# Patient Record
Sex: MALE | Race: WHITE | Employment: OTHER | URBAN - METROPOLITAN AREA
[De-identification: names, ages, dates, MRNs, and addresses within clinical notes are randomized per-mention and may not be internally consistent; named-entity substitution may affect disease eponyms.]

---

## 2019-03-24 ENCOUNTER — APPOINTMENT (OUTPATIENT)
Dept: CT IMAGING | Age: 69
End: 2019-03-24
Payer: MEDICARE

## 2019-03-24 ENCOUNTER — HOSPITAL ENCOUNTER (EMERGENCY)
Age: 69
Discharge: ANOTHER HOSPITAL | End: 2019-03-24
Attending: EMERGENCY MEDICINE
Payer: MEDICARE

## 2019-03-24 ENCOUNTER — APPOINTMENT (OUTPATIENT)
Dept: GENERAL RADIOLOGY | Age: 69
End: 2019-03-24
Payer: MEDICARE

## 2019-03-24 VITALS
OXYGEN SATURATION: 97 % | HEART RATE: 140 BPM | RESPIRATION RATE: 25 BRPM | TEMPERATURE: 98.2 F | DIASTOLIC BLOOD PRESSURE: 59 MMHG | WEIGHT: 240 LBS | SYSTOLIC BLOOD PRESSURE: 95 MMHG

## 2019-03-24 DIAGNOSIS — J98.59 MEDIASTINAL MASS: ICD-10-CM

## 2019-03-24 DIAGNOSIS — R06.03 ACUTE RESPIRATORY DISTRESS: Primary | ICD-10-CM

## 2019-03-24 DIAGNOSIS — I48.91 ATRIAL FIBRILLATION WITH RVR (HCC): ICD-10-CM

## 2019-03-24 LAB
ABSOLUTE EOS #: 0 K/UL (ref 0–0.4)
ABSOLUTE IMMATURE GRANULOCYTE: ABNORMAL K/UL (ref 0–0.3)
ABSOLUTE LYMPH #: 0.43 K/UL (ref 1–4.8)
ABSOLUTE MONO #: 1.14 K/UL (ref 0.1–0.8)
ANION GAP SERPL CALCULATED.3IONS-SCNC: 15 MMOL/L (ref 9–17)
BASOPHILS # BLD: 0 % (ref 0–2)
BASOPHILS ABSOLUTE: 0 K/UL (ref 0–0.2)
BNP INTERPRETATION: NORMAL
BUN BLDV-MCNC: 20 MG/DL (ref 8–23)
BUN/CREAT BLD: ABNORMAL (ref 9–20)
CALCIUM SERPL-MCNC: 8.6 MG/DL (ref 8.6–10.4)
CHLORIDE BLD-SCNC: 97 MMOL/L (ref 98–107)
CO2: 23 MMOL/L (ref 20–31)
CREAT SERPL-MCNC: 1 MG/DL (ref 0.7–1.2)
DIFFERENTIAL TYPE: ABNORMAL
DIRECT EXAM: NORMAL
EOSINOPHILS RELATIVE PERCENT: 0 % (ref 1–4)
GFR AFRICAN AMERICAN: >60 ML/MIN
GFR NON-AFRICAN AMERICAN: >60 ML/MIN
GFR SERPL CREATININE-BSD FRML MDRD: ABNORMAL ML/MIN/{1.73_M2}
GFR SERPL CREATININE-BSD FRML MDRD: ABNORMAL ML/MIN/{1.73_M2}
GLUCOSE BLD-MCNC: 156 MG/DL (ref 70–99)
HCT VFR BLD CALC: 43.9 % (ref 41–53)
HEMOGLOBIN: 14.9 G/DL (ref 13.5–17.5)
IMMATURE GRANULOCYTES: ABNORMAL %
INR BLD: 1
LACTIC ACID: 1.6 MMOL/L (ref 0.5–2.2)
LYMPHOCYTES # BLD: 3 % (ref 24–44)
Lab: NORMAL
MAGNESIUM: 2.1 MG/DL (ref 1.6–2.6)
MCH RBC QN AUTO: 32.4 PG (ref 26–34)
MCHC RBC AUTO-ENTMCNC: 34 G/DL (ref 31–37)
MCV RBC AUTO: 95.2 FL (ref 80–100)
MONOCYTES # BLD: 8 % (ref 1–7)
MORPHOLOGY: NORMAL
NRBC AUTOMATED: ABNORMAL PER 100 WBC
PARTIAL THROMBOPLASTIN TIME: 28.5 SEC (ref 21.3–31.3)
PDW BLD-RTO: 13.8 % (ref 12.5–15.4)
PLATELET # BLD: 317 K/UL (ref 140–450)
PLATELET ESTIMATE: ABNORMAL
PMV BLD AUTO: 6.7 FL (ref 6–12)
POTASSIUM SERPL-SCNC: 4 MMOL/L (ref 3.7–5.3)
PRO-BNP: 220 PG/ML
PROTHROMBIN TIME: 10 SEC (ref 9.4–12.6)
RBC # BLD: 4.62 M/UL (ref 4.5–5.9)
RBC # BLD: ABNORMAL 10*6/UL
SEG NEUTROPHILS: 89 % (ref 36–66)
SEGMENTED NEUTROPHILS ABSOLUTE COUNT: 12.73 K/UL (ref 1.8–7.7)
SODIUM BLD-SCNC: 135 MMOL/L (ref 135–144)
SPECIMEN DESCRIPTION: NORMAL
TROPONIN INTERP: NORMAL
TROPONIN T: NORMAL NG/ML
TROPONIN, HIGH SENSITIVITY: 13 NG/L (ref 0–22)
WBC # BLD: 14.3 K/UL (ref 3.5–11)
WBC # BLD: ABNORMAL 10*3/UL

## 2019-03-24 PROCEDURE — 93005 ELECTROCARDIOGRAM TRACING: CPT

## 2019-03-24 PROCEDURE — 94644 CONT INHLJ TX 1ST HOUR: CPT

## 2019-03-24 PROCEDURE — 2580000003 HC RX 258: Performed by: PHYSICIAN ASSISTANT

## 2019-03-24 PROCEDURE — 96365 THER/PROPH/DIAG IV INF INIT: CPT

## 2019-03-24 PROCEDURE — 6360000004 HC RX CONTRAST MEDICATION: Performed by: EMERGENCY MEDICINE

## 2019-03-24 PROCEDURE — 71045 X-RAY EXAM CHEST 1 VIEW: CPT

## 2019-03-24 PROCEDURE — 6360000002 HC RX W HCPCS: Performed by: EMERGENCY MEDICINE

## 2019-03-24 PROCEDURE — 85025 COMPLETE CBC W/AUTO DIFF WBC: CPT

## 2019-03-24 PROCEDURE — 80048 BASIC METABOLIC PNL TOTAL CA: CPT

## 2019-03-24 PROCEDURE — 96375 TX/PRO/DX INJ NEW DRUG ADDON: CPT

## 2019-03-24 PROCEDURE — 87581 M.PNEUMON DNA AMP PROBE: CPT

## 2019-03-24 PROCEDURE — 6370000000 HC RX 637 (ALT 250 FOR IP): Performed by: EMERGENCY MEDICINE

## 2019-03-24 PROCEDURE — 87486 CHLMYD PNEUM DNA AMP PROBE: CPT

## 2019-03-24 PROCEDURE — 94761 N-INVAS EAR/PLS OXIMETRY MLT: CPT

## 2019-03-24 PROCEDURE — 2580000003 HC RX 258: Performed by: EMERGENCY MEDICINE

## 2019-03-24 PROCEDURE — 94645 CONT INHLJ TX EACH ADDL HOUR: CPT

## 2019-03-24 PROCEDURE — 6360000002 HC RX W HCPCS: Performed by: PHYSICIAN ASSISTANT

## 2019-03-24 PROCEDURE — 6360000002 HC RX W HCPCS

## 2019-03-24 PROCEDURE — 85730 THROMBOPLASTIN TIME PARTIAL: CPT

## 2019-03-24 PROCEDURE — 99285 EMERGENCY DEPT VISIT HI MDM: CPT

## 2019-03-24 PROCEDURE — 87040 BLOOD CULTURE FOR BACTERIA: CPT

## 2019-03-24 PROCEDURE — 87798 DETECT AGENT NOS DNA AMP: CPT

## 2019-03-24 PROCEDURE — 2500000003 HC RX 250 WO HCPCS: Performed by: EMERGENCY MEDICINE

## 2019-03-24 PROCEDURE — 83880 ASSAY OF NATRIURETIC PEPTIDE: CPT

## 2019-03-24 PROCEDURE — 96367 TX/PROPH/DG ADDL SEQ IV INF: CPT

## 2019-03-24 PROCEDURE — 85610 PROTHROMBIN TIME: CPT

## 2019-03-24 PROCEDURE — 71260 CT THORAX DX C+: CPT

## 2019-03-24 PROCEDURE — 6370000000 HC RX 637 (ALT 250 FOR IP)

## 2019-03-24 PROCEDURE — 87804 INFLUENZA ASSAY W/OPTIC: CPT

## 2019-03-24 PROCEDURE — 94660 CPAP INITIATION&MGMT: CPT

## 2019-03-24 PROCEDURE — 96376 TX/PRO/DX INJ SAME DRUG ADON: CPT

## 2019-03-24 PROCEDURE — 84484 ASSAY OF TROPONIN QUANT: CPT

## 2019-03-24 PROCEDURE — 2700000000 HC OXYGEN THERAPY PER DAY

## 2019-03-24 PROCEDURE — 83605 ASSAY OF LACTIC ACID: CPT

## 2019-03-24 PROCEDURE — 94664 DEMO&/EVAL PT USE INHALER: CPT

## 2019-03-24 PROCEDURE — 87633 RESP VIRUS 12-25 TARGETS: CPT

## 2019-03-24 PROCEDURE — 94640 AIRWAY INHALATION TREATMENT: CPT

## 2019-03-24 PROCEDURE — 83735 ASSAY OF MAGNESIUM: CPT

## 2019-03-24 PROCEDURE — 36415 COLL VENOUS BLD VENIPUNCTURE: CPT

## 2019-03-24 RX ORDER — DILTIAZEM HYDROCHLORIDE 5 MG/ML
5 INJECTION INTRAVENOUS ONCE
Status: COMPLETED | OUTPATIENT
Start: 2019-03-24 | End: 2019-03-24

## 2019-03-24 RX ORDER — ACETAMINOPHEN 325 MG/1
650 TABLET ORAL ONCE
Status: COMPLETED | OUTPATIENT
Start: 2019-03-24 | End: 2019-03-24

## 2019-03-24 RX ORDER — METHYLPREDNISOLONE SODIUM SUCCINATE 125 MG/2ML
125 INJECTION, POWDER, LYOPHILIZED, FOR SOLUTION INTRAMUSCULAR; INTRAVENOUS ONCE
Status: COMPLETED | OUTPATIENT
Start: 2019-03-24 | End: 2019-03-24

## 2019-03-24 RX ORDER — 0.9 % SODIUM CHLORIDE 0.9 %
80 INTRAVENOUS SOLUTION INTRAVENOUS ONCE
Status: COMPLETED | OUTPATIENT
Start: 2019-03-24 | End: 2019-03-24

## 2019-03-24 RX ORDER — IPRATROPIUM BROMIDE AND ALBUTEROL SULFATE 2.5; .5 MG/3ML; MG/3ML
1 SOLUTION RESPIRATORY (INHALATION) ONCE
Status: COMPLETED | OUTPATIENT
Start: 2019-03-24 | End: 2019-03-24

## 2019-03-24 RX ORDER — 0.9 % SODIUM CHLORIDE 0.9 %
1000 INTRAVENOUS SOLUTION INTRAVENOUS ONCE
Status: COMPLETED | OUTPATIENT
Start: 2019-03-24 | End: 2019-03-24

## 2019-03-24 RX ORDER — ALBUTEROL SULFATE 2.5 MG/3ML
10 SOLUTION RESPIRATORY (INHALATION) EVERY 6 HOURS PRN
Status: DISCONTINUED | OUTPATIENT
Start: 2019-03-24 | End: 2019-03-24 | Stop reason: HOSPADM

## 2019-03-24 RX ORDER — IPRATROPIUM BROMIDE AND ALBUTEROL SULFATE 2.5; .5 MG/3ML; MG/3ML
SOLUTION RESPIRATORY (INHALATION)
Status: COMPLETED
Start: 2019-03-24 | End: 2019-03-24

## 2019-03-24 RX ORDER — SODIUM CHLORIDE 0.9 % (FLUSH) 0.9 %
10 SYRINGE (ML) INJECTION PRN
Status: DISCONTINUED | OUTPATIENT
Start: 2019-03-24 | End: 2019-03-24 | Stop reason: HOSPADM

## 2019-03-24 RX ADMIN — ALBUTEROL SULFATE: 5 SOLUTION RESPIRATORY (INHALATION) at 11:31

## 2019-03-24 RX ADMIN — SODIUM CHLORIDE 1000 ML: 9 INJECTION, SOLUTION INTRAVENOUS at 11:29

## 2019-03-24 RX ADMIN — Medication 10 ML: at 12:27

## 2019-03-24 RX ADMIN — SODIUM CHLORIDE 80 ML: 9 INJECTION, SOLUTION INTRAVENOUS at 12:26

## 2019-03-24 RX ADMIN — ALBUTEROL SULFATE 15 MG/HR: 5 SOLUTION RESPIRATORY (INHALATION) at 12:57

## 2019-03-24 RX ADMIN — IPRATROPIUM BROMIDE AND ALBUTEROL SULFATE 1 AMPULE: .5; 3 SOLUTION RESPIRATORY (INHALATION) at 11:33

## 2019-03-24 RX ADMIN — DILTIAZEM HYDROCHLORIDE 5 MG: 5 INJECTION INTRAVENOUS at 15:04

## 2019-03-24 RX ADMIN — IOVERSOL 75 ML: 741 INJECTION INTRA-ARTERIAL; INTRAVENOUS at 12:27

## 2019-03-24 RX ADMIN — ALBUTEROL SULFATE 10 MG: 2.5 SOLUTION RESPIRATORY (INHALATION) at 11:32

## 2019-03-24 RX ADMIN — CEFTRIAXONE SODIUM 1 G: 1 INJECTION, POWDER, FOR SOLUTION INTRAMUSCULAR; INTRAVENOUS at 12:48

## 2019-03-24 RX ADMIN — SODIUM CHLORIDE 1000 ML: 9 INJECTION, SOLUTION INTRAVENOUS at 16:14

## 2019-03-24 RX ADMIN — DEXTROSE MONOHYDRATE 500 MG: 50 INJECTION, SOLUTION INTRAVENOUS at 13:28

## 2019-03-24 RX ADMIN — IPRATROPIUM BROMIDE AND ALBUTEROL SULFATE 3 ML: .5; 3 SOLUTION RESPIRATORY (INHALATION) at 11:07

## 2019-03-24 RX ADMIN — DILTIAZEM HYDROCHLORIDE 5 MG: 5 INJECTION INTRAVENOUS at 15:18

## 2019-03-24 RX ADMIN — DILTIAZEM HYDROCHLORIDE 5 MG/HR: 5 INJECTION INTRAVENOUS at 15:20

## 2019-03-24 RX ADMIN — METHYLPREDNISOLONE SODIUM SUCCINATE 125 MG: 125 INJECTION, POWDER, FOR SOLUTION INTRAMUSCULAR; INTRAVENOUS at 12:09

## 2019-03-24 RX ADMIN — ACETAMINOPHEN 650 MG: 325 TABLET ORAL at 11:58

## 2019-03-24 ASSESSMENT — ENCOUNTER SYMPTOMS
ABDOMINAL PAIN: 0
EYE DISCHARGE: 0
WHEEZING: 1
NAUSEA: 0
BACK PAIN: 0
SORE THROAT: 0
SHORTNESS OF BREATH: 1
COUGH: 1
VOMITING: 0
EYE REDNESS: 0

## 2019-03-24 ASSESSMENT — PAIN SCALES - GENERAL: PAINLEVEL_OUTOF10: 0

## 2019-03-25 LAB
ADENOVIRUS PCR: NOT DETECTED
BORDETELLA PERTUSSIS PCR: NOT DETECTED
CHLAMYDIA PNEUMONIAE BY PCR: NOT DETECTED
CORONAVIRUS 229E PCR: NOT DETECTED
CORONAVIRUS HKU1 PCR: NOT DETECTED
CORONAVIRUS NL63 PCR: NOT DETECTED
CORONAVIRUS OC43 PCR: NOT DETECTED
EKG ATRIAL RATE: 122 BPM
EKG ATRIAL RATE: 144 BPM
EKG P AXIS: 80 DEGREES
EKG P-R INTERVAL: 146 MS
EKG Q-T INTERVAL: 310 MS
EKG Q-T INTERVAL: 336 MS
EKG QRS DURATION: 102 MS
EKG QRS DURATION: 98 MS
EKG QTC CALCULATION (BAZETT): 441 MS
EKG QTC CALCULATION (BAZETT): 544 MS
EKG R AXIS: -4 DEGREES
EKG R AXIS: 34 DEGREES
EKG T AXIS: 27 DEGREES
EKG T AXIS: 70 DEGREES
EKG VENTRICULAR RATE: 122 BPM
EKG VENTRICULAR RATE: 158 BPM
HUMAN METAPNEUMOVIRUS PCR: NOT DETECTED
INFLUENZA A BY PCR: DETECTED
INFLUENZA A H1 (2009) PCR: NOT DETECTED
INFLUENZA A H1 PCR: NOT DETECTED
INFLUENZA A H3 PCR: DETECTED
INFLUENZA B BY PCR: NOT DETECTED
MYCOPLASMA PNEUMONIAE PCR: NOT DETECTED
PARAINFLUENZA 1 PCR: NOT DETECTED
PARAINFLUENZA 2 PCR: NOT DETECTED
PARAINFLUENZA 3 PCR: NOT DETECTED
PARAINFLUENZA 4 PCR: NOT DETECTED
RESP SYNCYTIAL VIRUS PCR: NOT DETECTED
RHINO/ENTEROVIRUS PCR: NOT DETECTED
SPECIMEN DESCRIPTION: ABNORMAL

## 2019-03-30 LAB
CULTURE: NORMAL
CULTURE: NORMAL
Lab: NORMAL
Lab: NORMAL
SPECIMEN DESCRIPTION: NORMAL
SPECIMEN DESCRIPTION: NORMAL

## 2020-07-06 ENCOUNTER — HOSPITAL ENCOUNTER (INPATIENT)
Age: 70
LOS: 1 days | Discharge: ANOTHER HOSPITAL | DRG: 280 | End: 2020-07-07
Attending: SPECIALIST | Admitting: INTERNAL MEDICINE
Payer: MEDICARE

## 2020-07-06 ENCOUNTER — APPOINTMENT (OUTPATIENT)
Dept: GENERAL RADIOLOGY | Age: 70
DRG: 280 | End: 2020-07-06
Payer: MEDICARE

## 2020-07-06 LAB
ABSOLUTE EOS #: 0.2 K/UL (ref 0–0.4)
ABSOLUTE IMMATURE GRANULOCYTE: ABNORMAL K/UL (ref 0–0.3)
ABSOLUTE LYMPH #: 0.5 K/UL (ref 1–4.8)
ABSOLUTE MONO #: 0.8 K/UL (ref 0.1–1.2)
ALBUMIN SERPL-MCNC: 4.2 G/DL (ref 3.5–5.2)
ALBUMIN/GLOBULIN RATIO: 1.3 (ref 1–2.5)
ALP BLD-CCNC: 58 U/L (ref 40–129)
ALT SERPL-CCNC: 16 U/L (ref 5–41)
ANION GAP SERPL CALCULATED.3IONS-SCNC: 15 MMOL/L (ref 9–17)
AST SERPL-CCNC: 31 U/L
BASOPHILS # BLD: 0 % (ref 0–2)
BASOPHILS ABSOLUTE: 0 K/UL (ref 0–0.2)
BILIRUB SERPL-MCNC: 1.12 MG/DL (ref 0.3–1.2)
BILIRUBIN DIRECT: 0.23 MG/DL
BILIRUBIN, INDIRECT: 0.89 MG/DL (ref 0–1)
BNP INTERPRETATION: ABNORMAL
BUN BLDV-MCNC: 13 MG/DL (ref 8–23)
BUN/CREAT BLD: ABNORMAL (ref 9–20)
C-REACTIVE PROTEIN: 40.4 MG/L (ref 0–5)
CALCIUM SERPL-MCNC: 9 MG/DL (ref 8.6–10.4)
CHLORIDE BLD-SCNC: 94 MMOL/L (ref 98–107)
CO2: 25 MMOL/L (ref 20–31)
CREAT SERPL-MCNC: 0.97 MG/DL (ref 0.7–1.2)
D-DIMER QUANTITATIVE: 30.69 MG/L FEU
DIFFERENTIAL TYPE: ABNORMAL
EOSINOPHILS RELATIVE PERCENT: 2 % (ref 1–4)
GFR AFRICAN AMERICAN: >60 ML/MIN
GFR NON-AFRICAN AMERICAN: >60 ML/MIN
GFR SERPL CREATININE-BSD FRML MDRD: ABNORMAL ML/MIN/{1.73_M2}
GFR SERPL CREATININE-BSD FRML MDRD: ABNORMAL ML/MIN/{1.73_M2}
GLOBULIN: NORMAL G/DL (ref 1.5–3.8)
GLUCOSE BLD-MCNC: 89 MG/DL (ref 70–99)
HCT VFR BLD CALC: 38.3 % (ref 41–53)
HEMOGLOBIN: 12.4 G/DL (ref 13.5–17.5)
IMMATURE GRANULOCYTES: ABNORMAL %
LACTIC ACID, WHOLE BLOOD: NORMAL MMOL/L (ref 0.7–2.1)
LACTIC ACID: 1.1 MMOL/L (ref 0.5–2.2)
LYMPHOCYTES # BLD: 6 % (ref 24–44)
MCH RBC QN AUTO: 27.1 PG (ref 26–34)
MCHC RBC AUTO-ENTMCNC: 32.4 G/DL (ref 31–37)
MCV RBC AUTO: 83.6 FL (ref 80–100)
MONOCYTES # BLD: 9 % (ref 2–11)
NRBC AUTOMATED: ABNORMAL PER 100 WBC
PDW BLD-RTO: 17.9 % (ref 12.5–15.4)
PLATELET # BLD: 212 K/UL (ref 140–450)
PLATELET ESTIMATE: ABNORMAL
PMV BLD AUTO: 7.6 FL (ref 6–12)
POTASSIUM SERPL-SCNC: 4.4 MMOL/L (ref 3.7–5.3)
PRO-BNP: 3351 PG/ML
RBC # BLD: 4.57 M/UL (ref 4.5–5.9)
RBC # BLD: ABNORMAL 10*6/UL
SEDIMENTATION RATE, ERYTHROCYTE: 10 MM (ref 0–20)
SEG NEUTROPHILS: 83 % (ref 36–66)
SEGMENTED NEUTROPHILS ABSOLUTE COUNT: 7 K/UL (ref 1.8–7.7)
SODIUM BLD-SCNC: 134 MMOL/L (ref 135–144)
TOTAL PROTEIN: 7.4 G/DL (ref 6.4–8.3)
TROPONIN INTERP: ABNORMAL
TROPONIN T: ABNORMAL NG/ML
TROPONIN, HIGH SENSITIVITY: 690 NG/L (ref 0–22)
WBC # BLD: 8.4 K/UL (ref 3.5–11)
WBC # BLD: ABNORMAL 10*3/UL

## 2020-07-06 PROCEDURE — 85379 FIBRIN DEGRADATION QUANT: CPT

## 2020-07-06 PROCEDURE — 80048 BASIC METABOLIC PNL TOTAL CA: CPT

## 2020-07-06 PROCEDURE — 85025 COMPLETE CBC W/AUTO DIFF WBC: CPT

## 2020-07-06 PROCEDURE — 93005 ELECTROCARDIOGRAM TRACING: CPT | Performed by: SPECIALIST

## 2020-07-06 PROCEDURE — 83880 ASSAY OF NATRIURETIC PEPTIDE: CPT

## 2020-07-06 PROCEDURE — 84484 ASSAY OF TROPONIN QUANT: CPT

## 2020-07-06 PROCEDURE — 96372 THER/PROPH/DIAG INJ SC/IM: CPT

## 2020-07-06 PROCEDURE — 71045 X-RAY EXAM CHEST 1 VIEW: CPT

## 2020-07-06 PROCEDURE — 6370000000 HC RX 637 (ALT 250 FOR IP): Performed by: SPECIALIST

## 2020-07-06 PROCEDURE — 82728 ASSAY OF FERRITIN: CPT

## 2020-07-06 PROCEDURE — 80076 HEPATIC FUNCTION PANEL: CPT

## 2020-07-06 PROCEDURE — 84145 PROCALCITONIN (PCT): CPT

## 2020-07-06 PROCEDURE — 83605 ASSAY OF LACTIC ACID: CPT

## 2020-07-06 PROCEDURE — 36415 COLL VENOUS BLD VENIPUNCTURE: CPT

## 2020-07-06 PROCEDURE — 86140 C-REACTIVE PROTEIN: CPT

## 2020-07-06 PROCEDURE — 6360000002 HC RX W HCPCS: Performed by: SPECIALIST

## 2020-07-06 PROCEDURE — 99285 EMERGENCY DEPT VISIT HI MDM: CPT

## 2020-07-06 PROCEDURE — 85652 RBC SED RATE AUTOMATED: CPT

## 2020-07-06 RX ORDER — DILTIAZEM HYDROCHLORIDE 5 MG/ML
10 INJECTION INTRAVENOUS ONCE
Status: DISCONTINUED | OUTPATIENT
Start: 2020-07-06 | End: 2020-07-06

## 2020-07-06 RX ORDER — ASPIRIN 81 MG/1
324 TABLET, CHEWABLE ORAL ONCE
Status: COMPLETED | OUTPATIENT
Start: 2020-07-06 | End: 2020-07-06

## 2020-07-06 RX ORDER — ASPIRIN 81 MG/1
81 TABLET, CHEWABLE ORAL DAILY
Status: ON HOLD | COMMUNITY
End: 2020-07-07 | Stop reason: HOSPADM

## 2020-07-06 RX ORDER — FLUTICASONE FUROATE, UMECLIDINIUM BROMIDE AND VILANTEROL TRIFENATATE 100; 62.5; 25 UG/1; UG/1; UG/1
1 POWDER RESPIRATORY (INHALATION) DAILY
COMMUNITY

## 2020-07-06 RX ADMIN — ENOXAPARIN SODIUM 105 MG: 120 INJECTION SUBCUTANEOUS at 23:50

## 2020-07-06 RX ADMIN — ASPIRIN 81 MG 324 MG: 81 TABLET ORAL at 23:46

## 2020-07-07 ENCOUNTER — HOSPITAL ENCOUNTER (INPATIENT)
Age: 70
LOS: 4 days | Discharge: HOME | DRG: 175 | End: 2020-07-11
Attending: FAMILY MEDICINE | Admitting: FAMILY MEDICINE
Payer: MEDICARE

## 2020-07-07 ENCOUNTER — APPOINTMENT (OUTPATIENT)
Dept: CT IMAGING | Age: 70
DRG: 280 | End: 2020-07-07
Payer: MEDICARE

## 2020-07-07 ENCOUNTER — APPOINTMENT (OUTPATIENT)
Dept: ULTRASOUND IMAGING | Age: 70
DRG: 280 | End: 2020-07-07
Payer: MEDICARE

## 2020-07-07 VITALS
HEART RATE: 100 BPM | DIASTOLIC BLOOD PRESSURE: 81 MMHG | SYSTOLIC BLOOD PRESSURE: 112 MMHG | OXYGEN SATURATION: 92 % | WEIGHT: 252.8 LBS | BODY MASS INDEX: 36.19 KG/M2 | RESPIRATION RATE: 20 BRPM | TEMPERATURE: 98.4 F | HEIGHT: 70 IN

## 2020-07-07 PROBLEM — I48.92 ATRIAL FLUTTER (HCC): Status: ACTIVE | Noted: 2020-07-07

## 2020-07-07 PROBLEM — I48.0 PAROXYSMAL ATRIAL FIBRILLATION (HCC): Status: ACTIVE | Noted: 2020-07-07

## 2020-07-07 PROBLEM — J96.90 RESPIRATORY FAILURE (HCC): Status: ACTIVE | Noted: 2020-07-07

## 2020-07-07 PROBLEM — R09.02 HYPOXIA: Status: ACTIVE | Noted: 2020-07-07

## 2020-07-07 PROBLEM — J44.9 CHRONIC OBSTRUCTIVE PULMONARY DISEASE (HCC): Status: ACTIVE | Noted: 2020-07-07

## 2020-07-07 PROBLEM — R73.03 PREDIABETES: Status: ACTIVE | Noted: 2020-07-07

## 2020-07-07 PROBLEM — I26.99 PULMONARY EMBOLISM DURING CURRENT HOSPITALIZATION (HCC): Status: ACTIVE | Noted: 2020-07-07

## 2020-07-07 PROBLEM — I26.99 PE (PULMONARY THROMBOEMBOLISM) (HCC): Status: ACTIVE | Noted: 2020-07-07

## 2020-07-07 PROBLEM — E66.9 OBESITY (BMI 30-39.9): Status: ACTIVE | Noted: 2020-07-07

## 2020-07-07 PROBLEM — I21.4 NSTEMI (NON-ST ELEVATED MYOCARDIAL INFARCTION) (HCC): Status: ACTIVE | Noted: 2020-07-07

## 2020-07-07 PROBLEM — M54.30 SCIATICA: Status: ACTIVE | Noted: 2020-07-07

## 2020-07-07 PROBLEM — E78.1 HYPERTRIGLYCERIDEMIA: Status: ACTIVE | Noted: 2020-07-07

## 2020-07-07 LAB
EKG ATRIAL RATE: 104 BPM
EKG ATRIAL RATE: 104 BPM
EKG ATRIAL RATE: 277 BPM
EKG ATRIAL RATE: 278 BPM
EKG P AXIS: 33 DEGREES
EKG P AXIS: 51 DEGREES
EKG P AXIS: 54 DEGREES
EKG P-R INTERVAL: 200 MS
EKG P-R INTERVAL: 216 MS
EKG Q-T INTERVAL: 374 MS
EKG Q-T INTERVAL: 378 MS
EKG Q-T INTERVAL: 380 MS
EKG Q-T INTERVAL: 380 MS
EKG QRS DURATION: 142 MS
EKG QRS DURATION: 144 MS
EKG QRS DURATION: 144 MS
EKG QRS DURATION: 148 MS
EKG QTC CALCULATION (BAZETT): 491 MS
EKG QTC CALCULATION (BAZETT): 492 MS
EKG QTC CALCULATION (BAZETT): 499 MS
EKG QTC CALCULATION (BAZETT): 539 MS
EKG R AXIS: -11 DEGREES
EKG R AXIS: -14 DEGREES
EKG R AXIS: -16 DEGREES
EKG R AXIS: -59 DEGREES
EKG T AXIS: 13 DEGREES
EKG T AXIS: 38 DEGREES
EKG T AXIS: 40 DEGREES
EKG T AXIS: 43 DEGREES
EKG VENTRICULAR RATE: 102 BPM
EKG VENTRICULAR RATE: 104 BPM
EKG VENTRICULAR RATE: 104 BPM
EKG VENTRICULAR RATE: 121 BPM
ESTIMATED AVERAGE GLUCOSE: 126 MG/DL
FERRITIN: 157 UG/L (ref 30–400)
HBA1C MFR BLD: 6 % (ref 4–6)
HCT VFR BLD CALC: 38.1 % (ref 40.7–50.3)
HEMOGLOBIN: 11.7 G/DL (ref 13–17)
INR BLD: 1.2
LV EF: 35 %
LVEF MODALITY: NORMAL
MCH RBC QN AUTO: 26.5 PG (ref 25.2–33.5)
MCHC RBC AUTO-ENTMCNC: 30.7 G/DL (ref 28.4–34.8)
MCV RBC AUTO: 86.4 FL (ref 82.6–102.9)
NRBC AUTOMATED: 0 PER 100 WBC
PARTIAL THROMBOPLASTIN TIME: 44.5 SEC (ref 23.9–33.8)
PDW BLD-RTO: 16.2 % (ref 11.8–14.4)
PLATELET # BLD: 193 K/UL (ref 138–453)
PMV BLD AUTO: 9 FL (ref 8.1–13.5)
PROCALCITONIN: 0.1 NG/ML
PROTHROMBIN TIME: 14.9 SEC (ref 11.5–14.2)
RBC # BLD: 4.41 M/UL (ref 4.21–5.77)
SARS-COV-2, PCR: NORMAL
SARS-COV-2, RAPID: NOT DETECTED
SARS-COV-2: NORMAL
SOURCE: NORMAL
TROPONIN INTERP: ABNORMAL
TROPONIN T: ABNORMAL NG/ML
TROPONIN, HIGH SENSITIVITY: 650 NG/L (ref 0–22)
TROPONIN, HIGH SENSITIVITY: 740 NG/L (ref 0–22)
TROPONIN, HIGH SENSITIVITY: 773 NG/L (ref 0–22)
TROPONIN, HIGH SENSITIVITY: 883 NG/L (ref 0–22)
TROPONIN, HIGH SENSITIVITY: 950 NG/L (ref 0–22)
WBC # BLD: 9.7 K/UL (ref 3.5–11.3)

## 2020-07-07 PROCEDURE — 94761 N-INVAS EAR/PLS OXIMETRY MLT: CPT

## 2020-07-07 PROCEDURE — 84484 ASSAY OF TROPONIN QUANT: CPT

## 2020-07-07 PROCEDURE — 6360000002 HC RX W HCPCS: Performed by: INTERNAL MEDICINE

## 2020-07-07 PROCEDURE — 71260 CT THORAX DX C+: CPT

## 2020-07-07 PROCEDURE — 93971 EXTREMITY STUDY: CPT

## 2020-07-07 PROCEDURE — 2700000000 HC OXYGEN THERAPY PER DAY

## 2020-07-07 PROCEDURE — 83036 HEMOGLOBIN GLYCOSYLATED A1C: CPT

## 2020-07-07 PROCEDURE — 94640 AIRWAY INHALATION TREATMENT: CPT

## 2020-07-07 PROCEDURE — 93306 TTE W/DOPPLER COMPLETE: CPT

## 2020-07-07 PROCEDURE — 85520 HEPARIN ASSAY: CPT

## 2020-07-07 PROCEDURE — 6370000000 HC RX 637 (ALT 250 FOR IP): Performed by: NURSE PRACTITIONER

## 2020-07-07 PROCEDURE — 36415 COLL VENOUS BLD VENIPUNCTURE: CPT

## 2020-07-07 PROCEDURE — 94660 CPAP INITIATION&MGMT: CPT

## 2020-07-07 PROCEDURE — 2580000003 HC RX 258: Performed by: NURSE PRACTITIONER

## 2020-07-07 PROCEDURE — 6360000002 HC RX W HCPCS: Performed by: NURSE PRACTITIONER

## 2020-07-07 PROCEDURE — 99239 HOSP IP/OBS DSCHRG MGMT >30: CPT | Performed by: NURSE PRACTITIONER

## 2020-07-07 PROCEDURE — 96375 TX/PRO/DX INJ NEW DRUG ADDON: CPT

## 2020-07-07 PROCEDURE — 93005 ELECTROCARDIOGRAM TRACING: CPT | Performed by: SPECIALIST

## 2020-07-07 PROCEDURE — 2060000000 HC ICU INTERMEDIATE R&B

## 2020-07-07 PROCEDURE — 96365 THER/PROPH/DIAG IV INF INIT: CPT

## 2020-07-07 PROCEDURE — 2500000003 HC RX 250 WO HCPCS: Performed by: INTERNAL MEDICINE

## 2020-07-07 PROCEDURE — 85730 THROMBOPLASTIN TIME PARTIAL: CPT

## 2020-07-07 PROCEDURE — 2500000003 HC RX 250 WO HCPCS: Performed by: SPECIALIST

## 2020-07-07 PROCEDURE — 99222 1ST HOSP IP/OBS MODERATE 55: CPT | Performed by: INTERNAL MEDICINE

## 2020-07-07 PROCEDURE — 6360000002 HC RX W HCPCS: Performed by: SPECIALIST

## 2020-07-07 PROCEDURE — APPSS45 APP SPLIT SHARED TIME 31-45 MINUTES: Performed by: NURSE PRACTITIONER

## 2020-07-07 PROCEDURE — 1210000000 HC MED SURG R&B

## 2020-07-07 PROCEDURE — 87040 BLOOD CULTURE FOR BACTERIA: CPT

## 2020-07-07 PROCEDURE — 2580000003 HC RX 258: Performed by: SPECIALIST

## 2020-07-07 PROCEDURE — 6360000004 HC RX CONTRAST MEDICATION: Performed by: SPECIALIST

## 2020-07-07 PROCEDURE — 85610 PROTHROMBIN TIME: CPT

## 2020-07-07 PROCEDURE — 2580000003 HC RX 258: Performed by: INTERNAL MEDICINE

## 2020-07-07 PROCEDURE — 93005 ELECTROCARDIOGRAM TRACING: CPT | Performed by: NURSE PRACTITIONER

## 2020-07-07 PROCEDURE — 85027 COMPLETE CBC AUTOMATED: CPT

## 2020-07-07 RX ORDER — ATORVASTATIN CALCIUM 80 MG/1
80 TABLET, FILM COATED ORAL NIGHTLY
Status: DISCONTINUED | OUTPATIENT
Start: 2020-07-07 | End: 2020-07-11 | Stop reason: HOSPADM

## 2020-07-07 RX ORDER — ACETAMINOPHEN 325 MG/1
650 TABLET ORAL EVERY 6 HOURS PRN
Status: DISCONTINUED | OUTPATIENT
Start: 2020-07-07 | End: 2020-07-11 | Stop reason: HOSPADM

## 2020-07-07 RX ORDER — ONDANSETRON 2 MG/ML
4 INJECTION INTRAMUSCULAR; INTRAVENOUS EVERY 6 HOURS PRN
Status: DISCONTINUED | OUTPATIENT
Start: 2020-07-07 | End: 2020-07-07 | Stop reason: HOSPADM

## 2020-07-07 RX ORDER — HEPARIN SODIUM 5000 [USP'U]/ML
80 INJECTION, SOLUTION INTRAVENOUS; SUBCUTANEOUS PRN
Status: DISCONTINUED | OUTPATIENT
Start: 2020-07-07 | End: 2020-07-10

## 2020-07-07 RX ORDER — POTASSIUM CHLORIDE 7.45 MG/ML
10 INJECTION INTRAVENOUS PRN
Status: DISCONTINUED | OUTPATIENT
Start: 2020-07-07 | End: 2020-07-11 | Stop reason: HOSPADM

## 2020-07-07 RX ORDER — POTASSIUM CHLORIDE 20 MEQ/1
40 TABLET, EXTENDED RELEASE ORAL PRN
Status: DISCONTINUED | OUTPATIENT
Start: 2020-07-07 | End: 2020-07-07 | Stop reason: HOSPADM

## 2020-07-07 RX ORDER — FUROSEMIDE 10 MG/ML
20 INJECTION INTRAMUSCULAR; INTRAVENOUS ONCE
Status: COMPLETED | OUTPATIENT
Start: 2020-07-07 | End: 2020-07-07

## 2020-07-07 RX ORDER — DILTIAZEM HYDROCHLORIDE 5 MG/ML
10 INJECTION INTRAVENOUS ONCE
Status: COMPLETED | OUTPATIENT
Start: 2020-07-07 | End: 2020-07-07

## 2020-07-07 RX ORDER — ACETAMINOPHEN 325 MG/1
650 TABLET ORAL EVERY 6 HOURS PRN
Status: DISCONTINUED | OUTPATIENT
Start: 2020-07-07 | End: 2020-07-07 | Stop reason: HOSPADM

## 2020-07-07 RX ORDER — HEPARIN SODIUM 10000 [USP'U]/100ML
18 INJECTION, SOLUTION INTRAVENOUS CONTINUOUS
Status: DISCONTINUED | OUTPATIENT
Start: 2020-07-07 | End: 2020-07-10

## 2020-07-07 RX ORDER — PROMETHAZINE HYDROCHLORIDE 12.5 MG/1
12.5 TABLET ORAL EVERY 6 HOURS PRN
Qty: 28 TABLET | Refills: 0 | Status: ON HOLD | COMMUNITY
Start: 2020-07-07 | End: 2020-07-09

## 2020-07-07 RX ORDER — ATORVASTATIN CALCIUM 40 MG/1
80 TABLET, FILM COATED ORAL NIGHTLY
Status: DISCONTINUED | OUTPATIENT
Start: 2020-07-07 | End: 2020-07-07 | Stop reason: HOSPADM

## 2020-07-07 RX ORDER — POTASSIUM CHLORIDE 7.45 MG/ML
10 INJECTION INTRAVENOUS PRN
Status: DISCONTINUED | OUTPATIENT
Start: 2020-07-07 | End: 2020-07-07 | Stop reason: HOSPADM

## 2020-07-07 RX ORDER — PROMETHAZINE HYDROCHLORIDE 12.5 MG/1
12.5 TABLET ORAL EVERY 6 HOURS PRN
Status: DISCONTINUED | OUTPATIENT
Start: 2020-07-07 | End: 2020-07-11 | Stop reason: HOSPADM

## 2020-07-07 RX ORDER — LEVALBUTEROL 1.25 MG/.5ML
1.25 SOLUTION, CONCENTRATE RESPIRATORY (INHALATION)
Status: DISCONTINUED | OUTPATIENT
Start: 2020-07-07 | End: 2020-07-11 | Stop reason: HOSPADM

## 2020-07-07 RX ORDER — FAMOTIDINE 20 MG/1
20 TABLET, FILM COATED ORAL 2 TIMES DAILY
Qty: 60 TABLET | Refills: 0 | Status: ON HOLD | COMMUNITY
Start: 2020-07-07 | End: 2020-07-09

## 2020-07-07 RX ORDER — SODIUM CHLORIDE 0.9 % (FLUSH) 0.9 %
10 SYRINGE (ML) INJECTION PRN
Status: DISCONTINUED | OUTPATIENT
Start: 2020-07-07 | End: 2020-07-07 | Stop reason: HOSPADM

## 2020-07-07 RX ORDER — SODIUM CHLORIDE 0.9 % (FLUSH) 0.9 %
10 SYRINGE (ML) INJECTION PRN
Status: DISCONTINUED | OUTPATIENT
Start: 2020-07-07 | End: 2020-07-11 | Stop reason: HOSPADM

## 2020-07-07 RX ORDER — ATORVASTATIN CALCIUM 80 MG/1
80 TABLET, FILM COATED ORAL NIGHTLY
Qty: 30 TABLET | Refills: 0 | Status: ON HOLD
Start: 2020-07-07 | End: 2020-07-09

## 2020-07-07 RX ORDER — ONDANSETRON 2 MG/ML
4 INJECTION INTRAMUSCULAR; INTRAVENOUS EVERY 6 HOURS PRN
Qty: 84 ML | Status: ON HOLD | COMMUNITY
Start: 2020-07-07 | End: 2020-07-09

## 2020-07-07 RX ORDER — PROMETHAZINE HYDROCHLORIDE 25 MG/1
12.5 TABLET ORAL EVERY 6 HOURS PRN
Status: DISCONTINUED | OUTPATIENT
Start: 2020-07-07 | End: 2020-07-07 | Stop reason: HOSPADM

## 2020-07-07 RX ORDER — SODIUM CHLORIDE 0.9 % (FLUSH) 0.9 %
10 SYRINGE (ML) INJECTION EVERY 12 HOURS SCHEDULED
Status: DISCONTINUED | OUTPATIENT
Start: 2020-07-07 | End: 2020-07-07 | Stop reason: HOSPADM

## 2020-07-07 RX ORDER — NITROGLYCERIN 0.4 MG/1
0.4 TABLET SUBLINGUAL EVERY 5 MIN PRN
Status: DISCONTINUED | OUTPATIENT
Start: 2020-07-07 | End: 2020-07-07 | Stop reason: HOSPADM

## 2020-07-07 RX ORDER — SODIUM CHLORIDE 0.9 % (FLUSH) 0.9 %
10 SYRINGE (ML) INJECTION EVERY 12 HOURS SCHEDULED
Status: DISCONTINUED | OUTPATIENT
Start: 2020-07-07 | End: 2020-07-11 | Stop reason: HOSPADM

## 2020-07-07 RX ORDER — MAGNESIUM SULFATE 1 G/100ML
1 INJECTION INTRAVENOUS PRN
Status: DISCONTINUED | OUTPATIENT
Start: 2020-07-07 | End: 2020-07-07 | Stop reason: HOSPADM

## 2020-07-07 RX ORDER — 0.9 % SODIUM CHLORIDE 0.9 %
80 INTRAVENOUS SOLUTION INTRAVENOUS ONCE
Status: COMPLETED | OUTPATIENT
Start: 2020-07-07 | End: 2020-07-07

## 2020-07-07 RX ORDER — FAMOTIDINE 20 MG/1
20 TABLET, FILM COATED ORAL 2 TIMES DAILY
Status: DISCONTINUED | OUTPATIENT
Start: 2020-07-07 | End: 2020-07-09

## 2020-07-07 RX ORDER — NITROGLYCERIN 0.4 MG/1
TABLET SUBLINGUAL
Qty: 25 TABLET | Refills: 3 | Status: ON HOLD | COMMUNITY
Start: 2020-07-07 | End: 2020-07-09

## 2020-07-07 RX ORDER — FAMOTIDINE 20 MG/1
20 TABLET, FILM COATED ORAL 2 TIMES DAILY
Status: DISCONTINUED | OUTPATIENT
Start: 2020-07-07 | End: 2020-07-07 | Stop reason: HOSPADM

## 2020-07-07 RX ORDER — MAGNESIUM SULFATE 1 G/100ML
1 INJECTION INTRAVENOUS PRN
Status: DISCONTINUED | OUTPATIENT
Start: 2020-07-07 | End: 2020-07-11 | Stop reason: HOSPADM

## 2020-07-07 RX ORDER — SODIUM CHLORIDE 9 MG/ML
INJECTION, SOLUTION INTRAVENOUS CONTINUOUS
Status: DISCONTINUED | OUTPATIENT
Start: 2020-07-07 | End: 2020-07-07 | Stop reason: HOSPADM

## 2020-07-07 RX ORDER — ACETAMINOPHEN 650 MG/1
650 SUPPOSITORY RECTAL EVERY 6 HOURS PRN
Status: DISCONTINUED | OUTPATIENT
Start: 2020-07-07 | End: 2020-07-07 | Stop reason: HOSPADM

## 2020-07-07 RX ORDER — HEPARIN SODIUM 5000 [USP'U]/ML
40 INJECTION, SOLUTION INTRAVENOUS; SUBCUTANEOUS PRN
Status: DISCONTINUED | OUTPATIENT
Start: 2020-07-07 | End: 2020-07-10

## 2020-07-07 RX ORDER — ACETAMINOPHEN 650 MG/1
650 SUPPOSITORY RECTAL EVERY 6 HOURS PRN
Status: DISCONTINUED | OUTPATIENT
Start: 2020-07-07 | End: 2020-07-11 | Stop reason: HOSPADM

## 2020-07-07 RX ORDER — METHYLPREDNISOLONE SODIUM SUCCINATE 125 MG/2ML
125 INJECTION, POWDER, LYOPHILIZED, FOR SOLUTION INTRAMUSCULAR; INTRAVENOUS ONCE
Status: COMPLETED | OUTPATIENT
Start: 2020-07-07 | End: 2020-07-07

## 2020-07-07 RX ORDER — POTASSIUM CHLORIDE 20 MEQ/1
40 TABLET, EXTENDED RELEASE ORAL PRN
Status: DISCONTINUED | OUTPATIENT
Start: 2020-07-07 | End: 2020-07-11 | Stop reason: HOSPADM

## 2020-07-07 RX ORDER — HEPARIN SODIUM 10000 [USP'U]/100ML
18 INJECTION, SOLUTION INTRAVENOUS CONTINUOUS
Status: DISCONTINUED | OUTPATIENT
Start: 2020-07-07 | End: 2020-07-07 | Stop reason: HOSPADM

## 2020-07-07 RX ORDER — ASPIRIN 325 MG
325 TABLET ORAL DAILY
Status: DISCONTINUED | OUTPATIENT
Start: 2020-07-08 | End: 2020-07-11

## 2020-07-07 RX ORDER — HEPARIN SODIUM 10000 [USP'U]/100ML
18 INJECTION, SOLUTION INTRAVENOUS CONTINUOUS
Refills: 0 | Status: ON HOLD | COMMUNITY
Start: 2020-07-07 | End: 2020-07-09

## 2020-07-07 RX ORDER — SODIUM CHLORIDE FOR INHALATION 0.9 %
3 VIAL, NEBULIZER (ML) INHALATION EVERY 8 HOURS PRN
Status: DISCONTINUED | OUTPATIENT
Start: 2020-07-07 | End: 2020-07-11 | Stop reason: HOSPADM

## 2020-07-07 RX ORDER — ONDANSETRON 2 MG/ML
4 INJECTION INTRAMUSCULAR; INTRAVENOUS EVERY 6 HOURS PRN
Status: DISCONTINUED | OUTPATIENT
Start: 2020-07-07 | End: 2020-07-11 | Stop reason: HOSPADM

## 2020-07-07 RX ORDER — HEPARIN SODIUM 5000 [USP'U]/ML
80 INJECTION, SOLUTION INTRAVENOUS; SUBCUTANEOUS ONCE
Status: COMPLETED | OUTPATIENT
Start: 2020-07-07 | End: 2020-07-07

## 2020-07-07 RX ORDER — METHYLPREDNISOLONE SODIUM SUCCINATE 125 MG/2ML
60 INJECTION, POWDER, LYOPHILIZED, FOR SOLUTION INTRAMUSCULAR; INTRAVENOUS EVERY 8 HOURS
Status: DISCONTINUED | OUTPATIENT
Start: 2020-07-07 | End: 2020-07-08

## 2020-07-07 RX ADMIN — DILTIAZEM HYDROCHLORIDE 5 MG/HR: 5 INJECTION INTRAVENOUS at 02:29

## 2020-07-07 RX ADMIN — ENOXAPARIN SODIUM 105 MG: 120 INJECTION SUBCUTANEOUS at 07:53

## 2020-07-07 RX ADMIN — FUROSEMIDE 20 MG: 10 INJECTION, SOLUTION INTRAMUSCULAR; INTRAVENOUS at 05:28

## 2020-07-07 RX ADMIN — FAMOTIDINE 20 MG: 20 TABLET, FILM COATED ORAL at 20:26

## 2020-07-07 RX ADMIN — SODIUM CHLORIDE, PRESERVATIVE FREE 10 ML: 5 INJECTION INTRAVENOUS at 20:26

## 2020-07-07 RX ADMIN — CEFTRIAXONE SODIUM 1 G: 1 INJECTION, POWDER, FOR SOLUTION INTRAMUSCULAR; INTRAVENOUS at 01:08

## 2020-07-07 RX ADMIN — IOVERSOL 75 ML: 741 INJECTION INTRA-ARTERIAL; INTRAVENOUS at 00:59

## 2020-07-07 RX ADMIN — SODIUM CHLORIDE: 9 INJECTION, SOLUTION INTRAVENOUS at 09:45

## 2020-07-07 RX ADMIN — FAMOTIDINE 20 MG: 20 TABLET, FILM COATED ORAL at 07:53

## 2020-07-07 RX ADMIN — Medication 10 ML: at 00:59

## 2020-07-07 RX ADMIN — DILTIAZEM HYDROCHLORIDE 10 MG: 5 INJECTION INTRAVENOUS at 02:26

## 2020-07-07 RX ADMIN — HEPARIN SODIUM 18 UNITS/KG/HR: 10000 INJECTION, SOLUTION INTRAVENOUS at 18:04

## 2020-07-07 RX ADMIN — ATORVASTATIN CALCIUM 80 MG: 80 TABLET, FILM COATED ORAL at 20:26

## 2020-07-07 RX ADMIN — SODIUM CHLORIDE 80 ML: 9 INJECTION, SOLUTION INTRAVENOUS at 00:59

## 2020-07-07 RX ADMIN — LEVALBUTEROL 1.25 MG: 1.25 SOLUTION, CONCENTRATE RESPIRATORY (INHALATION) at 19:49

## 2020-07-07 RX ADMIN — METHYLPREDNISOLONE SODIUM SUCCINATE 125 MG: 125 INJECTION, POWDER, FOR SOLUTION INTRAMUSCULAR; INTRAVENOUS at 17:56

## 2020-07-07 RX ADMIN — HEPARIN SODIUM 8900 UNITS: 5000 INJECTION INTRAVENOUS; SUBCUTANEOUS at 18:03

## 2020-07-07 RX ADMIN — METOPROLOL TARTRATE 25 MG: 25 TABLET, FILM COATED ORAL at 20:26

## 2020-07-07 RX ADMIN — DEXMEDETOMIDINE HYDROCHLORIDE 0.2 MCG/KG/HR: 100 INJECTION, SOLUTION INTRAVENOUS at 21:57

## 2020-07-07 RX ADMIN — METOPROLOL TARTRATE 25 MG: 25 TABLET, FILM COATED ORAL at 07:53

## 2020-07-07 RX ADMIN — AZITHROMYCIN MONOHYDRATE 500 MG: 500 INJECTION, POWDER, LYOPHILIZED, FOR SOLUTION INTRAVENOUS at 01:52

## 2020-07-07 ASSESSMENT — ENCOUNTER SYMPTOMS
COLOR CHANGE: 0
ABDOMINAL PAIN: 0
SINUS PRESSURE: 0
NAUSEA: 0
PHOTOPHOBIA: 0
SHORTNESS OF BREATH: 1
COUGH: 1
SINUS PAIN: 0
ABDOMINAL DISTENTION: 0
EYE DISCHARGE: 0
CHEST TIGHTNESS: 1
SORE THROAT: 0
WHEEZING: 1
VOMITING: 0

## 2020-07-07 ASSESSMENT — PAIN SCALES - GENERAL
PAINLEVEL_OUTOF10: 0

## 2020-07-07 NOTE — DISCHARGE SUMMARY
at time of transfer. ED echo was completed and revealed reduced EF of 35 to 40% and no report of right sided heart strain. Significant therapeutic interventions:     IV Lasix x1  IV antibiotics  Lovenox  IV heparin  Aspirin  Statin  Beta-blockade  Pulmonary consult  Cardiology consult  Gentle IV hydration    Significant Diagnostic Studies:   Labs / Micro:  CBC:   Lab Results   Component Value Date    WBC 8.4 07/06/2020    RBC 4.57 07/06/2020    HGB 12.4 07/06/2020    HCT 38.3 07/06/2020    MCV 83.6 07/06/2020    MCH 27.1 07/06/2020    MCHC 32.4 07/06/2020    RDW 17.9 07/06/2020     07/06/2020     BMP:    Lab Results   Component Value Date    GLUCOSE 89 07/06/2020     07/06/2020    K 4.4 07/06/2020    CL 94 07/06/2020    CO2 25 07/06/2020    ANIONGAP 15 07/06/2020    BUN 13 07/06/2020    CREATININE 0.97 07/06/2020    BUNCRER NOT REPORTED 07/06/2020    CALCIUM 9.0 07/06/2020    LABGLOM >60 07/06/2020    GFRAA >60 07/06/2020    GFR      07/06/2020    GFR NOT REPORTED 07/06/2020     CMP:    Lab Results   Component Value Date    GLUCOSE 89 07/06/2020     07/06/2020    K 4.4 07/06/2020    CL 94 07/06/2020    CO2 25 07/06/2020    BUN 13 07/06/2020    CREATININE 0.97 07/06/2020    ANIONGAP 15 07/06/2020    ALKPHOS 58 07/06/2020    ALT 16 07/06/2020    AST 31 07/06/2020    BILITOT 1.12 07/06/2020    LABALBU 4.2 07/06/2020    ALBUMIN 1.3 07/06/2020    LABGLOM >60 07/06/2020    GFRAA >60 07/06/2020    GFR      07/06/2020    GFR NOT REPORTED 07/06/2020    PROT 7.4 07/06/2020    CALCIUM 9.0 07/06/2020      Results for Jazmin Thomas (MRN [id number]) as of 7/7/2020 14:29   Ref. Range 7/6/2020 22:25 7/7/2020 00:33 7/7/2020 03:49 7/7/2020 07:45   Troponin, High Sensitivity Latest Ref Range: 0 - 22 ng/L 690 (HH) 773 (HH) 883 () 950 LONG TERM ACUTE CARE [facility] CARE AT [facility])     Radiology:  Xr Chest Portable    Result Date: 7/7/2020  Large left pleural effusion with adjacent airspace disease/atelectasis.      Ct Chest Pulmonary Embolism W Contrast    Result Date: 7/7/2020  Right distal lobar and right lower lobe segmental emboli. Bowing of the interventricular septum worrisome for heart strain unclear if this is due to the right-sided emboli versus the other additional findings within the lungs. Moderate to large left-sided effusion with left basilar opacities suggestive of areas of compressive atelectasis. Right paratracheal mass and right hilar adenopathy. Please correlate with any potential biopsy results and history (previous exam recommended biopsy). This is otherwise unchanged in size from prior exam.     Us Dup Lower Extremity Left Bryan    Result Date: 7/7/2020  No convincing evidence of DVT in the left lower extremity. Us Dup Lower Extremity Right Bryan    Result Date: 7/7/2020  No convincing evidence of DVT in the right lower extremity. Consultations:    Consults:     Final Specialist Recommendations/Findings:   IP CONSULT TO CARDIOLOGY  IP CONSULT TO PULMONOLOGY      The patient was seen and examined on day of discharge and this discharge summary is in conjunction with any daily progress note from day of discharge.     Discharge plan:     Disposition: Discharge/Readmit    Physician Follow Up:     MD THOMAS Fofana [facility] [place] [address]  [phone]             Requiring Further Evaluation/Follow Up POST HOSPITALIZATION/Incidental Findings: NA    Diet: cardiac diet    Activity: As tolerated    Instructions to Patient: NA-patient being transferred to another hospital for higher level of care    Discharge Medications:      Medication List      START taking these medications    aspirin 325 MG EC tablet  Take 1 tablet by mouth daily  Start taking on:  July 8, 2020  Replaces:  aspirin 81 MG chewable tablet     atorvastatin 80 MG tablet  Commonly known as:  LIPITOR  Take 1 tablet by mouth nightly     famotidine 20 MG tablet  Commonly known as:  PEPCID     heparin (porcine) 100 UNIT/ML Soln infusion     metoprolol

## 2020-07-07 NOTE — PROGRESS NOTES
Spoke with Dr. Lawrence Brittney with cardiology regarding troponin of 950. States to keep NPO and he will see this afternoon. Agrees with echo and dopplers.

## 2020-07-07 NOTE — ED NOTES
Pt continues to c/o dizziness but denies shortness of breath.  Lung sounds continue to sound diminished with isolated expiratory wheezes     Radha Perez RN  07/07/20 0005

## 2020-07-07 NOTE — ED NOTES
[facility] messaged via [facility] per MD request.     Amparo Boogie RN  07/07/20 [id number]

## 2020-07-07 NOTE — PROGRESS NOTES
Access returned call. Patient to go to room 1106 at [address] [facility] [address]. Vivi Bhatt faxed to [facility].   time 400pm

## 2020-07-07 NOTE — CARE COORDINATION
Initial DC planning deferred at this time, cardiologist at bedside with RN and patient. Awaiting medical plan. 1130: Initial DC planning remains as deferred as patient is transferring to [facility] for higher LOC not available at current facility. Awaiting bed placement. 1405: Notified of bed open at [facility] in room 1106. Transportation requested faxed to [address] [facility]. Awaiting call back for transport time. 1434 : [facility] able to  between 1530 to 1600. RN notified patient.

## 2020-07-07 NOTE — ED NOTES
Pt presents to the ED via private auto. Pt states that he hasn't felt well for about a week and a half. Pt questions whether or not it is due to the new medicine Trilegy he is taking. Pt states he has felt dizzy like an off balanced feeling. Pt states he began to experience shortness of breath when he arrived in our emergency department.       Heaven Rolon RN  07/06/20 [id number]

## 2020-07-07 NOTE — CONSULTS
PULMONARY & CRITICAL CARE MEDICINE CONSULT NOTE     Patient:  Veronica Ceballos  MRN: [id number]  [facility] date: 7/6/2020  Primary Care Physician: Zhen Souza MD  Consulting Physician: Nessa Carbajal DO  CODE Status: Full Code     HISTORY     CHIEF COMPLAINT/REASON FOR CONSULT: Acute respiratory failure/left pleural effusion/pulmonary embolism/right paratracheal mass and hilar lymphadenopathy    HISTORY OF PRESENT ILLNESS:  The patient is a 68 y.o. male with past medical history significant for COPD, atrial fibrillation presented to the hospital with complaints of increased fatigability and generalized weakness for more than 1 week. Collette Ruts He is grade 2 exertional dyspnea, chronic cough and expectoration. He denies any fever or chills, and also denies any chest pain palpitation or diaphoresis. While in the ED he was noted to be A. Flutter and was started on with RVR Cardizem infusion and received a dose of 1 mg/kg subcutaneous Lovenox. Patient is currently on nasal cannula at 3 L/min. He attributes his worsening shortness of breath recent initiation of Trelegy Ellipta. He was previously on [place] and Spiriva. Review of the chart shows that he had similar presentation in March 2019 and he was transferred to [facility].  Records of hospitalization currently unavailable. However, it seems he was seen by Dr. Newton Cleveland and was recommended EBUS for further evaluation of right paratracheal mass, which patient apparently declined. His CT scan in the ER showed right distal lobar and right lower lobe segmental embolism, bowing of interventricular septum concerning for right heart strain. Patient also has a moderate to large left pleural effusion with compressive atelectasis. The right paratracheal mass seems to have increased in size as compared to the previous CT and there is also evidence of right hilar adenopathy.     PAST MEDICAL HISTORY:        Diagnosis Date    [facility]) 06/01/2019    COPD (chronic (Oral)   Resp 19   Ht 5' 10\" (1.778 m)   Wt 252 lb 12.8 oz (114.7 kg)   SpO2 92%   BMI 36.27 kg/m²   8-24 HR RANGE-  TEMP Temp  Av.1 °F (36.7 °C)  Min: 97.4 °F (36.3 °C)  Max: 99 °F (27.0 °C)   BP Systolic (62ZZI), NOA:210 , Min:106 , TSQ:877      Diastolic (00RKW), ZAP:35, Min:70, Max:97     PULSE Pulse  Av.4  Min: 95  Max: 108   RR Resp  Av.5  Min: 19  Max: 22   O2 SAT SpO2  Av.5 %  Min: 92 %  Max: 93 %   OXYGEN DELIVERY O2 Flow Rate (L/min)  Avg: 3 L/min  Min: 3 L/min  Max: 3 L/min     SYSTEMIC EXAMINATION:    General appearance -  overweight, comfortable and in no acute distress   Mental status - alert, oriented to person, place, and time   Eyes - pupils equal and reactive, extraocular eye movements intact, sclera anicteric   Mouth - mucous membranes moist   Neck - supple, no significant adenopathy, carotids upstroke normal bilaterally, no bruits   Lymphatics - no palpable lymphadenopathy, no hepatosplenomegaly   Chest - decreased air entry noted at left base, bilateral coarse breath sounds   Heart -irregularly irregular rhythm, normal S1, S2, no murmurs   Abdomen - soft, nontender, nondistended, no masses or organomegaly   Neurological - motor and sensory grossly normal bilaterally   Musculoskeletal - no joint tenderness, deformity or swelling   Extremities - peripheral pulses normal, 1+ pedal edema, no clubbing or cyanosis   Skin - normal coloration and turgor, no rashes, no suspicious skin lesions noted    DATA REVIEW     Medications: Current Inpatient  Scheduled Meds:   fluticasone-umeclidin-vilant  1 puff Inhalation Daily    sodium chloride flush  10 mL Intravenous 2 times per day    famotidine  20 mg Oral BID    atorvastatin  80 mg Oral Nightly    metoprolol tartrate  25 mg Oral BID    [START ON 2020] aspirin  325 mg Oral Daily     Continuous Infusions:   dilTIAZem (CARDIZEM) 125 mg in dextrose 5% 125 mL infusion Stopped (20 0944)    sodium chloride  heparin (porcine)       INPUT/OUTPUT:  In: -   Out: 725 [Urine:725]    LABS:-  ABG:   No results for input(s): POCPH, POCPCO2, POCPO2, POCHCO3, HVSX5HGH in the last 72 hours. CBC:   Recent Labs     07/06/20 2225   WBC 8.4   HGB 12.4*   HCT 38.3*   MCV 83.6      LYMPHOPCT 6*   RBC 4.57   MCH 27.1   MCHC 32.4   RDW 17.9*     BMP:   Recent Labs     07/06/20 2225   *   K 4.4   CL 94*   CO2 25   BUN 13   CREATININE 0.97   GLUCOSE 89     Liver Function Test:   Recent Labs     07/06/20 2225   PROT 7.4   LABALBU 4.2   ALT 16   AST 31   ALKPHOS 58   BILITOT 1.12     Amylase/Lipase:  No results for input(s): AMYLASE, LIPASE in the last 72 hours. Coagulation Profile:   No results for input(s): INR, PROTIME, APTT in the last 72 hours. Cardiac Enzymes:  No results for input(s): CKTOTAL, CKMB, CKMBINDEX, TROPONINI in the last 72 hours. Lactic Acid:  Lab Results   Component Value Date    LACTA 1.1 07/06/2020    LACTA 1.6 03/24/2019     BNP:   No results found for: BNP  D-Dimer:  Lab Results   Component Value Date    DDIMER 30.69 07/06/2020     Others:   No results found for: TSH, O2TVWBQ, G4QOHCB, THYROIDAB, FT3, T4FREE  Lab Results   Component Value Date    SEDRATE 10 07/06/2020    CRP 40.4 (H) 07/06/2020     No results found for: Zelpha Small  Lab Results   Component Value Date    FERRITIN 157 07/06/2020     No results found for: SPEP, UPEP  No results found for: PSA, CEA, , ZF5022,   Microbiology:    Pathology:    Radiology Reports:  US DUP LOWER EXTREMITY LEFT CURTIS   Final Result   No convincing evidence of DVT in the left lower extremity. US DUP LOWER EXTREMITY RIGHT CURTIS   Final Result   No convincing evidence of DVT in the right lower extremity. CT Chest Pulmonary Embolism W Contrast   Final Result   Right distal lobar and right lower lobe segmental emboli.       Bowing of the interventricular septum worrisome for heart strain unclear if   this is due to the right-sided emboli versus the other additional findings   within the lungs. Moderate to large left-sided effusion with left basilar opacities suggestive   of areas of compressive atelectasis. Right paratracheal mass and right hilar adenopathy. Please correlate with   any potential biopsy results and history (previous exam recommended biopsy). This is otherwise unchanged in size from prior exam.         XR CHEST PORTABLE   Final Result   Large left pleural effusion with adjacent airspace disease/atelectasis. Echocardiogram:   No results found for this or any previous visit. Cardiac Catheterization:   No results found for this or any previous visit. ASSESSMENT AND PLAN     Assessment:    // Acute hypoxemic respiratory failure  // Right paratracheal mass  // Right hilar adenopathy  // Moderate to large left pleural effusion with compressive atelectasis  // Atrial flutter with RVR  // Acute coronary syndrome  // Chronic obstructive pulmonary disease  // Obesity    Plan:    I personally interviewed/examined the patient; reviewed interval history, interpreted all available radiographic and laboratory data at the time of service.      CXR/CT Chest reviewed   Findings discussed with patient and his wife at bedside   Patient will need a left-sided thoracentesis and a EBUS/ TBNA paratracheal mass   Recommended him to be transferred to [place] for further management   However patient seems to be more inclined and going to [place] [id number] as it will be easier for her travel for his wife  Kenneth Piper He is currently saturating well on nasal cannula at 3 L/min   He is hemodynamically stable and is on Cardizem infusion   Continue supplemental oxygen to keep oxygen saturation greater than 92 %   Will recommend nocturnal and as needed noninvasive mechanical ventilation (BiPAP), wean as tolerated   Anticoagulation, currently on therapeutic dose of Lovenox, will recommend switching to IV heparin infusion as you will need procedures   Follow-up echo results   Encourage incentive spirometry   Continue pulmonary toilet, aspiration precautions and bronchodilators   Continue to monitor I/O with a goal of negative fluid balance   Antimicrobials reviewed; currently on ceftriaxone/azithromycin   Stress ulcer prophylaxis   Physical/occupational therapy; increase activity as tolerated    It was my pleasure to evaluate Rafy Smaller today. We will continue to follow. I would like to thank you for allowing me to participate in the care of this patient. Please feel free to call with any further questions or concerns. Sondra Bajwa M.D. Pulmonary and Critical Care Medicine           7/7/2020, 11:06 AM    Please note that this chart was generated using voice recognition Dragon dictation software. Although every effort was made to ensure the accuracy of this automated transcription, some errors in transcription may have occurred.

## 2020-07-07 NOTE — PROGRESS NOTES
Pt discharged via [facility] with all belongings and transfer paperwork. Report given to [employer]. Patient to go to room 2031. All question answered to satisfaction. Wife called and updated.

## 2020-07-07 NOTE — CONSULTS
Pulmonary Medicine and Critical Care Consult    Patient Darcy Reza   MRN -  [id number]   [facility]   - 1950      Date of Admission -  2020  4:24 PM  Date of evaluation -  2020  Room -    Marylin Gonzales MD Primary Care Physician - Lizbeth Florian MD     Reason for Consult      Shortness of breath  Assessment   · Acute hypoxic respiratory insufficiency  · Acute Exacerbation of COPD  · Large left pleural effusion  · Small subsegmental PEs  · Increased troponin/? Non-ST MI  · History of A. 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  · Obstructive sleep apnea    Recommendations   · Oxygen by nasal cannula  · BIPAP support  · Incentive spirometry every hour awake  · Xopenex by nebulizer  · treleg inhaler  · Solu-Medrol 60 IV every 8 hours  · Rocephin Zithromax  · Left thoracentesis check pleural fluid cytology and cultures  · Monitor troponin/echocardiogram  · Lower extremity venous Dopplers  · Cardiology evaluation  · Heparin drip  · PET  Outpatient/ may need EBUS  · Outpatient sleep evaluation  · Discussed with RN  · DVT prophylaxis    Problem List      Patient Active Problem List   Diagnosis    [facility]non-ST elevated [facility] infarction) [facility]    PE (pulmonary thromboembolism) (HCC)    Atrial flutter (HCC)    Chronic obstructive pulmonary disease (HCC)    Hypertriglyceridemia    Hypoxia    Paroxysmal atrial fibrillation (HCC)    Prediabetes    Respiratory failure (HCC)    Sciatica    Obesity (BMI 30-39. 9)    Pulmonary embolism during current hospitalization ([facility]    COPD (chronic obstructive pulmonary disease) ([facility]       [facility] Patient is 68 y.o.,  male, previous medical history of COPD, BETHANY, atrial fibrillation hyperlipidemia and obesity. He presented emergency room for worsening shortness of breath over the last few days. He has associated dry cough and wheezing.   He had no chest pain. He was found to be hypoxic requiring oxygen at 3 to 4 L nasal cannula. He had CT of the chest that showed right lower lobe  subsegmental PEs. He had large left pleural effusion and mediastinal mass noted. I was consulted for further evaluation. He has increased heart rate 1 2130/min. Blood pressure stable. He had quit smoking 1 year ago and smoked for many years. PMHx   Past Medical History      Diagnosis Date    [facility]    COPD (chronic obstructive pulmonary disease) (HCC)     Influenza A       Past Surgical History    No past surgical history on file. Meds    Current Medications    fluticasone-umeclidin-vilant  1 puff Inhalation Daily    sodium chloride flush  10 mL Intravenous 2 times per day    famotidine  20 mg Oral BID    atorvastatin  80 mg Oral Nightly    metoprolol tartrate  25 mg Oral BID    [START ON 7/8/2020] aspirin  325 mg Oral Daily    heparin (porcine)  80 Units/kg Intravenous Once     sodium chloride flush, potassium chloride **OR** potassium alternative oral replacement **OR** potassium chloride, magnesium sulfate, acetaminophen **OR** acetaminophen, magnesium hydroxide, promethazine **OR** ondansetron, perflutren lipid microspheres, heparin (porcine), heparin (porcine)  IV Drips/Infusions   heparin (porcine)       Home Medications  Medications Prior to Admission: [START ON 7/8/2020] aspirin 325 MG EC tablet, Take 1 tablet by mouth daily  nitroGLYCERIN (NITROSTAT) 0.4 MG SL tablet, up to max of 3 total doses. If no relief after 1 dose, call 911.   Heparin Sod, Porcine, in D5W (HEPARIN, PORCINE,) 100 UNIT/ML SOLN infusion, Infuse 2,064.6 Units/hr intravenously continuous  promethazine (PHENERGAN) 12.5 MG tablet, Take 1 tablet by mouth every 6 hours as needed for Nausea  ondansetron (ZOFRAN) 4 MG/2ML injection, Infuse 2 mLs intravenously every 6 hours as needed for Nausea or Vomiting  atorvastatin (LIPITOR) 80 MG tablet, Take 1 tablet by mouth nightly  metoprolol tartrate (LOPRESSOR) 25 MG tablet, Take 1 tablet by mouth 2 times daily  famotidine (PEPCID) 20 MG tablet, Take 1 tablet by mouth 2 times daily  fluticasone-umeclidin-vilant (TRELEGY ELLIPTA) 100-62.5-25 MCG/INH AEPB, Inhale 1 puff into the lungs daily    Allergies    Patient has no known allergies.   Social History     Social History     Socioeconomic History    Marital status:      Spouse name: Not on file    Number of children: Not on file    Years of education: Not on file    Highest education level: Not on file   Occupational History    Not on file   Social Needs    Financial resource strain: Not on file    Food insecurity     Worry: Not on file     Inability: Not on file    Transportation needs     Medical: Not on file     Non-medical: Not on file   Tobacco Use    Smoking status: Former Smoker     Packs/day: 1.50     Years: 25.00     Pack years: 37.50     Last attempt to quit: 2019     Years since quittin.5    Smokeless tobacco: Never Used   Substance and Sexual Activity    Alcohol use: Not Currently     Alcohol/week: 4.0 standard drinks     Types: 4 Cans of beer per week     Comment: quit in 2019    Drug use: Never    Sexual activity: Not on file   Lifestyle    Physical activity     Days per week: Not on file     Minutes per session: Not on file    Stress: Not on file   Relationships    Social connections     Talks on phone: Not on file     Gets together: Not on file     Attends Jainism service: Not on file     Active member of club or organization: Not on file     Attends meetings of clubs or organizations: Not on file     Relationship status: Not on file    Intimate partner violence     Fear of current or ex partner: Not on file     Emotionally abused: Not on file     Physically abused: Not on file     Forced sexual activity: Not on file   Other Topics Concern    Not on file   Social History Narrative    Not on file     Family History          Problem Relation Age of Onset    No Known Problems Mother     Heart Disease Father     No Known Problems Brother     No Known Problems Brother      ROS - 11 systems   General Denies any fever or chills  HEENT Denies any diplopia, tinnitus or vertigo  Resp as above  Cardiac Denies any chest pain,claudication or edema  GI Denies any melena, hematochezia, hematemesis or pyrosis   Denies any frequency, urgency, hesitancy or incontinence  Heme Denies bruising or bleeding easily  Endocrine Denies any history of diabetes or thyroid disease  Neuro Denies any focal motor  deficits  Psychiatric Denies anxiety, depression, suicidal ideation  Skin Denies rashes, itching, open sores    Vitals     height is 5' 10\" (1.778 m) and weight is 245 lb 9.5 oz (111.4 kg). His temporal temperature is 98.1 °F (36.7 °C). His blood pressure is 151/90 (abnormal) and his pulse is 120. His respiration is 18 and oxygen saturation is 93%. Body mass index is 35.24 kg/m². I/O    No intake or output data in the 24 hours ending 07/07/20 1719  No intake/output data recorded. Patient Vitals for the past 96 hrs (Last 3 readings):   Weight   07/07/20 1630 245 lb 9.5 oz (111.4 kg)   07/07/20 1620 245 lb 9.6 oz (111.4 kg)     Exam   General Appearance  Awake, alert, oriented, in no acute distress  HEENT - Head is normocephalic, atraumatic. Pupil reactive to light  Neck - Supple, symmetrical, trachea midline and Soft, trachea midline and straight  Lungs -decreased breath sounds left lobe bilateral fine wheezing  Cardiovascular - Heart sounds are normal.  Regular rhythm normal rate without murmur, gallop or rub. Abdomen - Soft, nontender, nondistended, no masses or organomegaly  Neurologic - CN II-XII are grossly intact.  There are no focal motor or sensory deficits  Skin - No bruising or bleeding  Extremities - No cyanosis, clubbing or edema    Labs  - Old records and notes have been reviewed in [facility]   CBC     Lab Results   Component Value Date    WBC 8.4 07/06/2020    RBC 4.57 07/06/2020    HGB 12.4 07/06/2020    HCT 38.3 07/06/2020     07/06/2020    MCV 83.6 07/06/2020    MCH 27.1 07/06/2020    MCHC 32.4 07/06/2020    RDW 17.9 07/06/2020    LYMPHOPCT 6 07/06/2020    MONOPCT 9 07/06/2020    BASOPCT 0 07/06/2020    MONOSABS 0.80 07/06/2020    LYMPHSABS 0.50 07/06/2020    EOSABS 0.20 07/06/2020    BASOSABS 0.00 07/06/2020    DIFFTYPE NOT REPORTED 07/06/2020     BMP   Lab Results   Component Value Date     07/06/2020    K 4.4 07/06/2020    CL 94 07/06/2020    CO2 25 07/06/2020    BUN 13 07/06/2020    CREATININE 0.97 07/06/2020    GLUCOSE 89 07/06/2020    CALCIUM 9.0 07/06/2020    MG 2.1 03/24/2019     LFTS  Lab Results   Component Value Date    ALKPHOS 58 07/06/2020    ALT 16 07/06/2020    AST 31 07/06/2020    PROT 7.4 07/06/2020    BILITOT 1.12 07/06/2020    BILIDIR 0.23 07/06/2020    IBILI 0.89 07/06/2020    LABALBU 4.2 07/06/2020       Lab Results   Component Value Date    APTT 28.5 03/24/2019     INR   Lab Results   Component Value Date    INR 1.0 03/24/2019    PROTIME 10.0 03/24/2019       Radiology          CT chest    Right distal lobar and right lower lobe segmental emboli.       Bowing of the interventricular septum worrisome for heart strain unclear if   this is due to the right-sided emboli versus the other additional findings   within the lungs.       Moderate to large left-sided effusion with left basilar opacities suggestive   of areas of compressive atelectasis.       Right paratracheal mass and right hilar adenopathy.  Please correlate with   any potential biopsy results and history (previous exam recommended biopsy). This is otherwise unchanged in size from prior exam           (See actual reports for details)    \"Thank you for asking us to see this patient\"    Case discussed with nurse and patient/family. Questions and concerns addressed.     Electronically signed by     Bree Thomas MD on 7/7/2020 at 5:19 PM

## 2020-07-07 NOTE — FLOWSHEET NOTE
Situation:  Writer visited with Patient prior to his discharge to [facility].     Assessment:  Mr. Mike Dinero was sitting on the edge of his bed. His wife was sitting on the couch. Pt shared that he was waiting to go to [address] [place], [place] [address] [facility] for a procedure. He and Wife noted that Pt tested negative for the virus and expressed relief. Patient and Wife shared that they have been  for 42 years and accessed their humor when acknowledging the accomplishment. Wife, who identified as Sikh, draws strength from prayer and from asking others to pray for her and her family. Pt is looking forward to returning home to recuperate. Intervention:  Writer provided supportive presence and explored Pt's coping and needs; inquired about Pt's sources of support and strength; offered words of support and encouragement. Outcome:  Patient and Wife acknowledged writer's words of support and encouragement and expressed thanks. 07/07/20 1233   Encounter Summary   Services provided to: Patient and family together   Referral/Consult From: [address] [place] [address] Drive; Family members   Continue Visiting   (7/7/20)   Complexity of Encounter Low   Length of Encounter 15 minutes   Spiritual Assessment Completed Yes   Routine   Type Initial   Spiritual/Pentecostal   Type Spiritual support   Assessment Calm; Approachable;Coping; Hopeful   Intervention Active listening;Explored feelings, thoughts, concerns;Explored coping resources;Sustaining presence/ Ministry of presence   Outcome Hopeful;Encouraged;Receptive;Coping;Engaged in conversation;Expressed gratitude     Electronically signed by Kalli Braxton, Oncology Outpatient [address], [id number] [facility]iation On[facility]  7/7/2020  12:35 PM

## 2020-07-07 NOTE — PLAN OF CARE
Problem: Falls - Risk of:  Goal: Will remain free from falls  7/7/2020 1235 by Yady Rosales RN  Outcome: Completed   Fall assessment preformed. Bed in low locked position with call light and tray table within reach. Education given. Will continue to monitor. Problem: Health Behavior:  Goal: Ability to manage health-related needs will improve  7/7/2020 1235 by Yady Rosales RN  Outcome: Completed   Education given on the importance of maintaining and checking blood sugars. Reviewed and re-educated on current diabetic medication and low carb diet. Patient verbalizes understanding. Problem: Pain:  Goal: Patient's pain/discomfort is manageable  7/7/2020 1235 by Yady Rosales RN  Outcome: Completed   Pain scale preformed per protocol and pt treated for pain as documented. Education given. Problem: Breathing Pattern - Ineffective:  Goal: Ability to achieve and maintain a regular respiratory rate will improve  7/7/2020 1235 by Yady Rosales RN  Outcome: Completed   Coughing deep breathing encouraged. supplemental O2 used as ordered. Respiratory therapist at bedside for education.
will improve  Outcome: Ongoing  Note: Pt remained free of falls, non skid socks are on, bed alarm on, call light within reach, and patient instructed to call for help when needed.    Goal: Will show no signs and symptoms of excessive bleeding  Description: Will show no signs and symptoms of excessive bleeding  Outcome: Ongoing  Goal: Free from accidental physical injury  Description: Free from accidental physical injury  Outcome: Ongoing  Goal: Free from intentional harm  Description: Free from intentional harm  Outcome: Ongoing     Problem: Infection:  Goal: Will remain free from infection  Description: Will remain free from infection  Outcome: Ongoing     Problem: Daily Care:  Goal: Daily care needs are met  Description: Daily care needs are met  Outcome: Ongoing     Problem: Pain:  Goal: Patient's pain/discomfort is manageable  Description: Patient's pain/discomfort is manageable  Outcome: Ongoing     Problem: Skin Integrity:  Goal: Skin integrity will stabilize  Description: Skin integrity will stabilize  Outcome: Ongoing     Problem: Discharge Planning:  Goal: Patients continuum of care needs are met  Description: Patients continuum of care needs are met  Outcome: Ongoing     Problem: Airway Clearance - Ineffective:  Goal: Ability to maintain a clear airway will improve  Description: Ability to maintain a clear airway will improve  Outcome: Ongoing     Problem: Breathing Pattern - Ineffective:  Goal: Ability to achieve and maintain a regular respiratory rate will improve  Description: Ability to achieve and maintain a regular respiratory rate will improve  Outcome: Ongoing     Problem: Gas Exchange - Impaired:  Goal: Levels of oxygenation will improve  Description: Levels of oxygenation will improve  Outcome: Ongoing

## 2020-07-07 NOTE — PROGRESS NOTES
[facility] on  Bi pass. Patient agreeable to [place]. Access called by Veronica Gilliland. P await bed placement.

## 2020-07-07 NOTE — FLOWSHEET NOTE
Dr. Ramona Pearl in to see pt along with Nikki Richards NP.  STAT 12 lead EKG requested and completed at this time. Determined bedside thoracentesis will not happen. Dr. Ramona Pearl ordered 125mg Solumedrol, BiPap with nothing less than 16/8 settings, precedex gtt PRN, ricks cather placement. Dr. Ramona Pearl stated no diuresis and to not drop the pt HR. Pt refused ricks placement at this time.

## 2020-07-07 NOTE — PROGRESS NOTES
Dr. Lino Lam called to unit. After reviewing chart physician would like patient transferred to [facility] for possible cath. Thierno Arana NP Updated.

## 2020-07-07 NOTE — H&P
[facility]    HISTORY AND PHYSICAL EXAMINATION            Date:   7/7/2020  Patient name:  Anna Marie Yee  Date of admission:  7/6/2020  9:56 PM  MRN:   [id number]  Account:  [de-identified]  YOB: 1950  PCP:    Roc Velez MD  Room:   325/325-01  Code Status:    Full Code    Chief Complaint:     Chief Complaint   Patient presents with    Dizziness     pt states symptoms for the last few days, also had episode of constipation and decreased appetite    Fatigue       History Obtained From:     patient, electronic medical record    History of Present Illness:     Anna Marie Yee is a 68 y.o. Non-/non  male who presents with Dizziness (pt states symptoms for the last few days, also had episode of constipation and decreased appetite) and Fatigue   and is admitted to the hospital for the management of NSTEMI (non-ST elevated myocardial infarction) ([facility] [address] Patient receives reports he has had loss of energy for the past week. He initially thought this was from an insect bite he sustained a week ago to his right forearm that made his whole right arm swell. He denies any sick contacts or recent travel. He decided to come to the ED after he became short of breath yesterday while taking a shower. He also experienced nausea, headache, and felt faint. He has a history of COPD, and was started on Trelegy that was ordered by his PCP when he was seen a couple of weeks ago. He also has a history of atrial fib. He says he has not had any issue with atrial fibrillation since he had influenza A a year ago. While in the ED, his blood pressure was 106/70 and his heart rate was 104. His respiratory rate was 22, and his SPO2 was 94%. He was afebrile. EKG was completed and revealed atrial flutter and a right bundle branch block, nonspecific changes to the T wave and ST segments and no Q waves.   T the chest was completed and revealed right distal lobar and right lower lobe segmental emboli. There was also a right paratracheal mass and right hilar adenopathy seen, as well as a moderate to large left-sided effusion with left basilar opacities. Troponins were elevated at 773, CRP 40.4, d-dimer 30.69, pro BNP 3351. Patient was giving IV fluids and started on Cardizem bolus and IV infusion. He was also given full-strength aspirin and Lovenox 1 mg/kg subcu. He was given IV Rocephin and IV azithromycin. Was then admitted to the progressive inpatient unit for evaluation and further management of acute PE, pleural effusion, atrial flutter, and elevated troponin. Cardiology was consulted from the ED. On arrival to the nursing unit, pulmonary was consulted. Pulmonary evaluated and recommends patient undergo left-sided thoracentesis and biopsy of the paratracheal mass seen on CT, recommended patient be transferred to [facility] for further management. He is requiring supplemental O2 to keep his SPO2 greater than 92%. Decision made to transfer patient to [facility] as [facility] is on bypass at this time. Heparin drip ordered per cardiology. Patient remains n.p.o. at this time. Past Medical History:     Past Medical History:   Diagnosis Date    Afib ([facility] 06/01/2019    COPD (chronic obstructive pulmonary disease) ([facility]     Influenza A         Past Surgical History:     Patient denies any surgical history. Medications Prior to Admission:     Prior to Admission medications    Medication Sig Start Date End Date Taking? Authorizing Provider   fluticasone-umeclidin-vilant (TRELEGY ELLIPTA) 100-62.5-25 MCG/INH AEPB Inhale 1 puff into the lungs daily   Yes Historical Provider, MD   aspirin 81 MG chewable tablet Take 81 mg by mouth daily   Yes Historical Provider, MD        Allergies:     Patient has no known allergies. Social History:     Tobacco:    reports that he quit smoking about 18 months ago.  He has a 37.50 pack-year Basophils 0 0 - 2 %    Immature Granulocytes NOT REPORTED 0 %    Segs Absolute 7.00 1.8 - 7.7 k/uL    Absolute Lymph # 0.50 (L) 1.0 - 4.8 k/uL    Absolute Mono # 0.80 0.1 - 1.2 k/uL    Absolute Eos # 0.20 0.0 - 0.4 k/uL    Basophils Absolute 0.00 0.0 - 0.2 k/uL    Absolute Immature Granulocyte NOT REPORTED 0.00 - 0.30 k/uL    WBC Morphology NOT REPORTED     RBC Morphology NOT REPORTED     Platelet Estimate NOT REPORTED    Basic Metabolic Panel w/ Reflex to MG    Collection Time: 07/06/20 10:25 PM   Result Value Ref Range    Glucose 89 70 - 99 mg/dL    BUN 13 8 - 23 mg/dL    CREATININE 0.97 0.70 - 1.20 mg/dL    Bun/Cre Ratio NOT REPORTED 9 - 20    Calcium 9.0 8.6 - 10.4 mg/dL    Sodium 134 (L) 135 - 144 mmol/L    Potassium 4.4 3.7 - 5.3 mmol/L    Chloride 94 (L) 98 - 107 mmol/L    CO2 25 20 - 31 mmol/L    Anion Gap 15 9 - 17 mmol/L    GFR Non-African American >60 >60 mL/min    GFR African American >60 >60 mL/min    GFR Comment          GFR Staging NOT REPORTED    Troponin    Collection Time: 07/06/20 10:25 PM   Result Value Ref Range    Troponin, High Sensitivity 690 (HH) 0 - 22 ng/L    Troponin T NOT REPORTED <0.03 ng/mL    Troponin Interp NOT REPORTED    Brain Natriuretic Peptide    Collection Time: 07/06/20 10:25 PM   Result Value Ref Range    Pro-BNP 3,351 (H) <300 pg/mL    BNP Interpretation Pro-BNP Reference Range:    D-Dimer, Quantitative    Collection Time: 07/06/20 10:25 PM   Result Value Ref Range    D-Dimer, Quant 30.69 mg/L FEU   Lactic Acid, Plasma    Collection Time: 07/06/20 10:25 PM   Result Value Ref Range    Lactic Acid 1.1 0.5 - 2.2 mmol/L    Lactic Acid, Whole Blood NOT REPORTED 0.7 - 2.1 mmol/L   C-Reactive Protein    Collection Time: 07/06/20 10:25 PM   Result Value Ref Range    CRP 40.4 (H) 0.0 - 5.0 mg/L   Sedimentation Rate    Collection Time: 07/06/20 10:25 PM   Result Value Ref Range    Sed Rate 10 0 - 20 mm   FERRITIN    Collection Time: 07/06/20 10:25 PM   Result Value Ref Range Ferritin 157 30 - 400 ug/L   Procalcitonin    Collection Time: 07/06/20 10:25 PM   Result Value Ref Range    Procalcitonin 0.10 (H) <0.09 ng/mL   Hepatic Function Panel    Collection Time: 07/06/20 10:25 PM   Result Value Ref Range    Alb 4.2 3.5 - 5.2 g/dL    Alkaline Phosphatase 58 40 - 129 U/L    ALT 16 5 - 41 U/L    AST 31 <40 U/L    Total Bilirubin 1.12 0.3 - 1.2 mg/dL    Bilirubin, Direct 0.23 <0.31 mg/dL    Bilirubin, Indirect 0.89 0.00 - 1.00 mg/dL    Total Protein 7.4 6.4 - 8.3 g/dL    Globulin NOT REPORTED 1.5 - 3.8 g/dL    Albumin/Globulin Ratio 1.3 1.0 - 2.5   EKG 12 Lead    Collection Time: 07/06/20 11:07 PM   Result Value Ref Range    Ventricular Rate 104 BPM    Atrial Rate 104 BPM    P-R Interval 200 ms    QRS Duration 148 ms    Q-T Interval 380 ms    QTc Calculation (Bazett) 499 ms    P Axis 51 degrees    R Axis -11 degrees    T Axis 40 degrees   Troponin    Collection Time: 07/07/20 12:33 AM   Result Value Ref Range    Troponin, High Sensitivity 773 (HH) 0 - 22 ng/L    Troponin T NOT REPORTED <0.03 ng/mL    Troponin Interp NOT REPORTED    Culture, Blood 1    Collection Time: 07/07/20 12:33 AM   Result Value Ref Range    Specimen Description . BLOOD     Special Requests 15MLS RIGHT HAND     Culture NO GROWTH 9 HOURS    Culture, Blood 1    Collection Time: 07/07/20 12:37 AM   Result Value Ref Range    Specimen Description . BLOOD     Special Requests 20MLS LEFT AC     Culture NO GROWTH 9 HOURS    EKG 12 Lead    Collection Time: 07/07/20  1:49 AM   Result Value Ref Range    Ventricular Rate 121 BPM    Atrial Rate 277 BPM    QRS Duration 142 ms    Q-T Interval 380 ms    QTc Calculation (Bazett) 539 ms    P Axis 33 degrees    R Axis -14 degrees    T Axis 38 degrees   COVID-19    Collection Time: 07/07/20  2:08 AM   Result Value Ref Range    SARS-CoV-2          SARS-CoV-2, Rapid Not Detected Not Detected    Source . NASOPHARYNGEAL SWAB     SARS-CoV-2, PCR         Troponin    Collection Time: 07/07/20  3:49 AM   Result Value Ref Range    Troponin, High Sensitivity 883 (HH) 0 - 22 ng/L    Troponin T NOT REPORTED <0.03 ng/mL    Troponin Interp NOT REPORTED    EKG 12 Lead    Collection Time: 07/07/20  7:38 AM   Result Value Ref Range    Ventricular Rate 102 BPM    Atrial Rate 104 BPM    P-R Interval 216 ms    QRS Duration 144 ms    Q-T Interval 378 ms    QTc Calculation (Bazett) 492 ms    P Axis 54 degrees    R Axis -16 degrees    T Axis 43 degrees   Troponin    Collection Time: 07/07/20  7:45 AM   Result Value Ref Range    Troponin, High Sensitivity 950 (HH) 0 - 22 ng/L    Troponin T NOT REPORTED <0.03 ng/mL    Troponin Interp NOT REPORTED        Imaging/Diagnostics:  Xr Chest Portable    Result Date: 7/7/2020  Large left pleural effusion with adjacent airspace disease/atelectasis. Ct Chest Pulmonary Embolism W Contrast    Result Date: 7/7/2020  Right distal lobar and right lower lobe segmental emboli. Bowing of the interventricular septum worrisome for heart strain unclear if this is due to the right-sided emboli versus the other additional findings within the lungs. Moderate to large left-sided effusion with left basilar opacities suggestive of areas of compressive atelectasis. Right paratracheal mass and right hilar adenopathy. Please correlate with any potential biopsy results and history (previous exam recommended biopsy). This is otherwise unchanged in size from prior exam.       Assessment :      Hospital Problems           Last Modified POA    * (Principal) NSTEMI (non-ST elevated myocardial infarction) ([address] 7/7/2020 Yes    PE (pulmonary thromboembolism) ([address] 7/7/2020 Yes    Atrial flutter ([address] 7/7/2020 Yes    Chronic obstructive pulmonary disease ([address] 7/7/2020 Yes    Obesity (BMI 30-39. 9) 7/7/2020 Yes          Plan:     Patient status inpatient in the Progressive Unit/Step down    1. Trend troponins  2. Discontinue Lovenox and start high intensity heparin.   No bolus per cardiology recommendations. 3. Continuous telemetry  4. 2D echo completed. EF 35 to 40% without report of right heart strain. 5. Gentle IV hydration  6. Supplemental O2 to keep SPO2 greater than 92%  7. Check A1c.  Keep n.p.o. for now. 8. Cardiac diet when able to eat. 9. Cardiology to evaluate  10. Pulmonology following. Appreciate input. 11. Transfer to [facility] DR SERGIO RILEY for higher level of care  12. Daily BMP. Replace electrolytes as needed. Consultations:   IP CONSULT TO CARDIOLOGY  IP CONSULT TO PULMONOLOGY    Patient is admitted as inpatient status because of co-morbidities listed above, severity of signs and symptoms as outlined, requirement for current medical therapies and most importantly because of direct risk to patient if care not provided in a hospital setting.     DONALD Varner - NP  7/7/2020  2:30 PM    Copy sent to Dr. Sreekanth Henderson MD

## 2020-07-07 NOTE — FLOWSHEET NOTE
Dr. Dorothea De La Torre paged regarding cardiology consultation. Dr. Romo Generous covering. Awaiting response at this time.

## 2020-07-07 NOTE — ED NOTES
Dr. Nayana Melendez calls back and speaks with Dr. Paul Walter regarding pt admission to [facility]     Paul Eubanks RN  07/07/20 2989

## 2020-07-07 NOTE — CONSULTS
Dr. Dorothea De La Torre was contacted by ER prior to pt being admitted. I also just talked to him myself at [phone] and updated him on pts labs and condition.

## 2020-07-07 NOTE — ED PROVIDER NOTES
[facility] ENCOUNTER      Pt Name: Diana Champagne  MRN: [id number]  [place] 1950  Date of evaluation: 7/7/20      CHIEF COMPLAINT       Chief Complaint   Patient presents with    Dizziness     pt states symptoms for the last few days, also had episode of constipation and decreased appetite    Fatigue         HISTORY OF PRESENT ILLNESS    Diana Champagne is a 68 y.o. male who presents to the emergency department brought in via private vehicle accompanied by his wife after patient has not been feeling well for about 1 and half weeks. He has been feeling weak, lethargic with no energy and feeling tired and fatigued easily. His appetite was fair until today when he has lost appetite today. He has not been feeling normal self. Wife has noticed shortness of breath upon mild to moderate exertion. Patient has history of COPD and has chronic cough and congestion but denies any fever or chills and denies any chest pain, palpitations or diaphoresis. He has quit smoking 1 year ago. He does not use any oxygen at home. He denies any upper or lower back pain or flank pain. He denies any headache, tingling or numbness or weakness in any of the extremities. He has history of atrial fibrillation for which she was admitted in March 2019 but he is not on any anticoagulants. REVIEW OF SYSTEMS       Review of Systems    All systems reviewed and negative unless noted in HPI. The patient denies fever or chills  Denies vision change. Denies any sore throat or rhinorrhea. Denies any neck pain or stiffness. Denies chest pain, palpitations or diaphoresis    No nausea,  vomiting or diarrhea. Denies any dysuria. Denies urinary frequency or hematuria. Denies musculoskeletal injury or pain. Denies any numbness or focal neurologic deficit. Denies any skin rash or edema. No recent psychiatric issues. No easy bruising or bleeding.    Denies any polyuria, polydypsia or history of immunocompromise. PAST MEDICAL HISTORY    has a past medical history of COPD (chronic obstructive pulmonary disease) ([facility] [address] SURGICAL HISTORY      has no past surgical history on file. CURRENT MEDICATIONS       Previous Medications    ASPIRIN 81 MG CHEWABLE TABLET    Take 81 mg by mouth daily    FLUTICASONE-UMECLIDIN-VILANT (TRELEGY ELLIPTA) 100-62.5-25 MCG/INH AEPB    Inhale 1 puff into the lungs daily       ALLERGIES     has No Known Allergies. FAMILY HISTORY     has no family status information on file. family history is not on file. SOCIAL HISTORY      reports that he has quit smoking. He has a 37.50 pack-year smoking history. He has never used smokeless tobacco. He reports previous alcohol use of about 4.0 standard drinks of alcohol per week. He reports that he does not use drugs. PHYSICAL EXAM     INITIAL VITALS:  height is 5' 10\" (1.778 m) and weight is 108.9 kg (240 lb). His oral temperature is 97.9 °F (36.6 °C). His blood pressure is 106/70 and his pulse is 104. His respiration is 22 and oxygen saturation is 94%. Physical Exam  Vitals signs and nursing note reviewed. Constitutional:       Appearance: He is well-developed. HENT:      Head: Normocephalic and atraumatic. Nose: Nose normal.   Eyes:      Extraocular Movements: Extraocular movements intact. Pupils: Pupils are equal, round, and reactive to light. Neck:      Musculoskeletal: Normal range of motion and neck supple. Cardiovascular:      Rate and Rhythm: Regular rhythm. Tachycardia present. Heart sounds: Normal heart sounds. No murmur. Pulmonary:      Effort: Pulmonary effort is normal. No respiratory distress. Breath sounds: Decreased breath sounds present. Abdominal:      General: Bowel sounds are normal. There is no distension. Palpations: Abdomen is soft. Tenderness: There is no abdominal tenderness. Skin:     General: Skin is warm and dry.    Neurological: General: No focal deficit present. Mental Status: He is alert and oriented to person, place, and time. DIFFERENTIAL DIAGNOSIS/ MDM:     Bronchitis, pneumonia, COPD exacerbation, CHF, ACS, Asthma, PE, Anxiety, Pneumothorax, Pulmonary Fibrosis    DIAGNOSTIC RESULTS     EKG: All EKG's are interpreted by the Emergency Department Physician who either signs or Co-signs this chart in the absence of a cardiologist.    EKG Interpretation    Interpreted by emergency department physician    Rhythm: atrial flutter  Rate: tachycardia  Axis: normal  Ectopy: none  Conduction: right bundle branch block (complete)  ST Segments: nonspecific changes  T Waves: non specific changes  Q Waves: none    Clinical Impression: atrial flutter (new onset) and right bundle branch block    Tariq Jones    RADIOLOGY:   Non-plain film images such as CT, Ultrasound and MRI are read by the radiologist. Joseph Chiara radiographic images are visualized and the radiologist interpretations are reviewed as follows:     Xr Chest Portable    Result Date: 7/7/2020  EXAMINATION: ONE XRAY VIEW OF THE CHEST 7/6/2020 11:01 pm COMPARISON: 03/24/2019 HISTORY: ORDERING SYSTEM PROVIDED HISTORY: Cough, SOB TECHNOLOGIST PROVIDED HISTORY: Cough, SOB Reason for Exam: cough Acuity: Acute Type of Exam: Initial Additional signs and symptoms: SOB Relevant Medical/Surgical History: COPD FINDINGS: Cardiac silhouette is enlarged and there is mild central vasculature. Large left pleural effusion with adjacent airspace disease/atelectasis. Right hemithorax is clear. Trachea is midline. Osseous structures and soft tissues are grossly intact. Large left pleural effusion with adjacent airspace disease/atelectasis.      Ct Chest Pulmonary Embolism W Contrast    Result Date: 7/7/2020  EXAMINATION: CTA OF THE CHEST 7/7/2020 12:37 am TECHNIQUE: CTA of the chest was performed after the administration of intravenous contrast.  Multiplanar reformatted images are provided results and history (previous exam recommended biopsy).  This is otherwise unchanged in size from prior exam.         LABS:  Results for orders placed or performed during the hospital encounter of 07/06/20   CBC Auto Differential   Result Value Ref Range    WBC 8.4 3.5 - 11.0 k/uL    RBC 4.57 4.5 - 5.9 m/uL    Hemoglobin 12.4 (L) 13.5 - 17.5 g/dL    Hematocrit 38.3 (L) 41 - 53 %    MCV 83.6 80 - 100 fL    MCH 27.1 26 - 34 pg    MCHC 32.4 31 - 37 g/dL    RDW 17.9 (H) 12.5 - 15.4 %    Platelets 706 404 - 910 k/uL    MPV 7.6 6.0 - 12.0 fL    NRBC Automated NOT REPORTED per 100 WBC    Differential Type NOT REPORTED     Seg Neutrophils 83 (H) 36 - 66 %    Lymphocytes 6 (L) 24 - 44 %    Monocytes 9 2 - 11 %    Eosinophils % 2 1 - 4 %    Basophils 0 0 - 2 %    Immature Granulocytes NOT REPORTED 0 %    Segs Absolute 7.00 1.8 - 7.7 k/uL    Absolute Lymph # 0.50 (L) 1.0 - 4.8 k/uL    Absolute Mono # 0.80 0.1 - 1.2 k/uL    Absolute Eos # 0.20 0.0 - 0.4 k/uL    Basophils Absolute 0.00 0.0 - 0.2 k/uL    Absolute Immature Granulocyte NOT REPORTED 0.00 - 0.30 k/uL    WBC Morphology NOT REPORTED     RBC Morphology NOT REPORTED     Platelet Estimate NOT REPORTED    Basic Metabolic Panel w/ Reflex to MG   Result Value Ref Range    Glucose 89 70 - 99 mg/dL    BUN 13 8 - 23 mg/dL    CREATININE 0.97 0.70 - 1.20 mg/dL    Bun/Cre Ratio NOT REPORTED 9 - 20    Calcium 9.0 8.6 - 10.4 mg/dL    Sodium 134 (L) 135 - 144 mmol/L    Potassium 4.4 3.7 - 5.3 mmol/L    Chloride 94 (L) 98 - 107 mmol/L    CO2 25 20 - 31 mmol/L    Anion Gap 15 9 - 17 mmol/L    GFR Non-African American >60 >60 mL/min    GFR African American >60 >60 mL/min    GFR Comment          GFR Staging NOT REPORTED    Troponin   Result Value Ref Range    Troponin, High Sensitivity 690 (HH) 0 - 22 ng/L    Troponin T NOT REPORTED <0.03 ng/mL    Troponin Interp NOT REPORTED    Brain Natriuretic Peptide   Result Value Ref Range    Pro-BNP 3,351 (H) <300 pg/mL    BNP Interpretation Pro-BNP Reference Range:    D-Dimer, Quantitative   Result Value Ref Range    D-Dimer, Quant 30.69 mg/L FEU   Lactic Acid, Plasma   Result Value Ref Range    Lactic Acid 1.1 0.5 - 2.2 mmol/L    Lactic Acid, Whole Blood NOT REPORTED 0.7 - 2.1 mmol/L   C-Reactive Protein   Result Value Ref Range    CRP 40.4 (H) 0.0 - 5.0 mg/L   Sedimentation Rate   Result Value Ref Range    Sed Rate 10 0 - 20 mm   Hepatic Function Panel   Result Value Ref Range    Alb 4.2 3.5 - 5.2 g/dL    Alkaline Phosphatase 58 40 - 129 U/L    ALT 16 5 - 41 U/L    AST 31 <40 U/L    Total Bilirubin 1.12 0.3 - 1.2 mg/dL    Bilirubin, Direct 0.23 <0.31 mg/dL    Bilirubin, Indirect 0.89 0.00 - 1.00 mg/dL    Total Protein 7.4 6.4 - 8.3 g/dL    Globulin NOT REPORTED 1.5 - 3.8 g/dL    Albumin/Globulin Ratio 1.3 1.0 - 2.5   Troponin   Result Value Ref Range    Troponin, High Sensitivity 773 (HH) 0 - 22 ng/L    Troponin T NOT REPORTED <0.03 ng/mL    Troponin Interp NOT REPORTED    COVID-19   Result Value Ref Range    SARS-CoV-2          SARS-CoV-2, Rapid Not Detected Not Detected    Source . NASOPHARYNGEAL SWAB     SARS-CoV-2, PCR         EKG 12 Lead   Result Value Ref Range    Ventricular Rate 104 BPM    Atrial Rate 104 BPM    P-R Interval 200 ms    QRS Duration 148 ms    Q-T Interval 380 ms    QTc Calculation (Bazett) 499 ms    P Axis 51 degrees    R Axis -11 degrees    T Axis 40 degrees   EKG 12 Lead   Result Value Ref Range    Ventricular Rate 121 BPM    Atrial Rate 277 BPM    QRS Duration 142 ms    Q-T Interval 380 ms    QTc Calculation (Bazett) 539 ms    P Axis 33 degrees    R Axis -14 degrees    T Axis 38 degrees         EMERGENCY DEPARTMENT COURSE:   Vitals:    Vitals:    07/06/20 2300 07/06/20 2352 07/07/20 0038 07/07/20 0232   BP: 137/78 (!) 139/97 (!) 134/94 106/70   Pulse: 105 105 108 104   Resp: 16 18  22   Temp:       TempSrc:       SpO2: 94% 94% 96% 94%   Weight:       Height:         -------------------------  BP: 106/70, Temp: 97.9 °F (36.6 °C), Pulse: 104, Resp: 22    Orders Placed This Encounter   Medications    DISCONTD: dilTIAZem injection 10 mg    DISCONTD: dilTIAZem 125 mg in dextrose 5 % 125 mL infusion    aspirin chewable tablet 324 mg    enoxaparin (LOVENOX) injection 105 mg    AND Linked Order Group     cefTRIAXone (ROCEPHIN) 1 g IVPB in 50 mL D5W minibag     azithromycin (ZITHROMAX) 500 mg in dextrose 5 % IVPB    ioversol (OPTIRAY) 74 % injection 75 mL    0.9 % sodium chloride bolus    sodium chloride flush 0.9 % injection 10 mL    dilTIAZem injection 10 mg    dilTIAZem 125 mg in dextrose 5 % 125 mL infusion         During the emergency department course, patient was put on the monitor which reveals atrial flutter with rapid ventricular response. IV normal saline was started and patient was given Cardizem bolus followed by infusion. He was also given aspirin 324 mg orally and Lovenox 1 mg/kg subcutaneously. After blood cultures and lactic acid were obtained, patient was given ceftriaxone and azithromycin IV piggyback. Patient is resting comfortably and denies any chest pain. He has remained hemodynamically stable and maintaining his pulse oximetry well. I discussed the case with cardiologist Dr. Anival Francois and Thomas Moreland CNP covering for hospitalist group. Plan is to admit the patient on stepdown unit for further evaluation and management. CONSULTS:  Thomas Moreland and Dr. Zheng Chairez:  None    CRITICAL CARE: There was a high probability of clinically significant/life threatening deterioration in this patient's condition which required my urgent intervention. Total critical care time was 60 minutes. This excludes any time for separately reportable procedures. FINAL IMPRESSION      1. Acute pulmonary embolism, unspecified pulmonary embolism type, unspecified whether acute cor pulmonale present ([address]    2. Pleural effusion, left    3. Elevated troponin level    4.  Atrial flutter, paroxysmal ([address] DISPOSITION/PLAN       PATIENT REFERRED TO:  MD THOMAS Giron [address]  [address]  [phone]            DISCHARGE MEDICATIONS:  New Prescriptions    No medications on file       (Please note that portions of this note were completed with a voice recognition program.  Efforts were made to edit the dictations but occasionally words are mis-transcribed.)    Elder MD, F.A.C.E.P.   Attending Emergency Medicine Physician     Meek Cabezas MD  07/07/20 9206

## 2020-07-07 NOTE — H&P
[facility]    HISTORY AND PHYSICAL EXAMINATION            Date:   7/7/2020  Patient name:  Paulie Cespedes  Date of admission:  7/7/2020  4:24 PM  MRN:   [id number]  Account:  [de-identified]  YOB: 1950  PCP:    Karime Patel MD  Room:   2031/2031-01  Code Status:    Full Code    Chief Complaint:     Shortness of breath and fatigue    History Obtained From:     patient    History of Present Illness:     Paulie Cespedes is a 68 y.o. Non-/non  male who presents with No chief complaint on file. and is admitted to the hospital for the management of NSTEMI (non-ST elevated myocardial infarction) ([facility] [address] Patient reports to outside facility for shortness of breath for the past several days. Patient was initially evaluated at [facility] where he was found to have CT findings consistent with multiple pulmonary emboli in the right lower lung. Patient was also found to have markedly elevated troponins and found shortness of breath with a cough that is productive for white sputum. Patient reports that his symptoms are made worse by exertion and  states that this is become progressively worse. Patient was also found to have a significant  left-sided pleural effusion with a questionable mass that will need further evaluation. Patient's troponins are rising sharply and we have requested at the outlying facility start him on a heparin drip immediately. Patient denies any fever chills. No changes in bowel or bladder habits. No neurological symptoms at this time. At the time of my exam patient is considerably dyspneic and exerting himself and is only able to speak in short 1-2 word sentences. Patient is also significantly tachycardic. Patient denies any diaphoresis. Patient's lung sounds are significantly diminished in the bilateral bases with rales noted.   Patient does have a productive cough and it is coughing up has never used smokeless tobacco.  Alcohol:      reports previous alcohol use of about 4.0 standard drinks of alcohol per week. Drug Use:  reports no history of drug use. Family History:     Family History   Problem Relation Age of Onset    No Known Problems Mother     Heart Disease Father     No Known Problems Brother     No Known Problems Brother        Review of Systems:     Positive and Negative as described in HPI. Review of Systems   Constitutional: Positive for activity change and fatigue. Negative for fever. HENT: Negative for sinus pressure, sinus pain and sore throat. Eyes: Negative for photophobia, discharge and visual disturbance. Respiratory: Positive for cough, chest tightness, shortness of breath and wheezing. Cardiovascular: Positive for chest pain. Negative for palpitations. Gastrointestinal: Negative for abdominal distention, abdominal pain, nausea and vomiting. Endocrine: Negative for cold intolerance and heat intolerance. Genitourinary: Negative for frequency, hematuria, penile pain and urgency. Musculoskeletal: Negative for arthralgias and myalgias. Skin: Positive for pallor. Negative for color change and rash. Allergic/Immunologic: Negative for environmental allergies, food allergies and immunocompromised state. Neurological: Negative for dizziness, syncope, weakness and numbness. Hematological: Negative for adenopathy. Does not bruise/bleed easily. Psychiatric/Behavioral: Negative for agitation and confusion. Physical Exam:   /86   Pulse 119   Temp 98.1 °F (36.7 °C) (Temporal)   Resp 22   Ht 5' 10\" (1.778 m)   Wt 245 lb 9.5 oz (111.4 kg)   SpO2 93%   BMI 35.24 kg/m²   Temp (24hrs), Av.2 °F (36.8 °C), Min:97.4 °F (36.3 °C), Max:99 °F (37.2 °C)    No results for input(s): POCGLU in the last 72 hours. No intake or output data in the 24 hours ending 20 1740    Physical Exam  Constitutional:       General: He is in acute distress. Appearance: He is normal weight. He is ill-appearing. He is not diaphoretic. Interventions: He is not intubated. HENT:      Head: Normocephalic. Nose: Nose normal.      Mouth/Throat:      Mouth: Mucous membranes are moist.   Eyes:      Extraocular Movements: Extraocular movements intact. Pupils: Pupils are equal, round, and reactive to light. Cardiovascular:      Rate and Rhythm: Regular rhythm. Tachycardia present. Pulses: Normal pulses. Heart sounds: Murmur present. Pulmonary:      Effort: Tachypnea and respiratory distress present. He is not intubated. Breath sounds: No stridor, decreased air movement or transmitted upper airway sounds. Examination of the right-middle field reveals rales. Examination of the left-middle field reveals rales. Examination of the right-lower field reveals decreased breath sounds and rales. Examination of the left-lower field reveals decreased breath sounds and rales. Decreased breath sounds and rales present. Abdominal:      General: Abdomen is flat. Bowel sounds are normal.   Musculoskeletal: Normal range of motion. General: No swelling, tenderness, deformity or signs of injury. Skin:     General: Skin is warm and dry. Capillary Refill: Capillary refill takes 2 to 3 seconds. Neurological:      General: No focal deficit present. Mental Status: He is alert and oriented to person, place, and time.    Psychiatric:         Mood and Affect: Mood normal.         Behavior: Behavior normal.         Investigations:      Laboratory Testing:  Recent Results (from the past 24 hour(s))   EKG 12 Lead    Collection Time: 07/06/20 10:13 PM   Result Value Ref Range    Ventricular Rate 104 BPM    Atrial Rate 278 BPM    QRS Duration 144 ms    Q-T Interval 374 ms    QTc Calculation (Bazett) 491 ms    R Axis -59 degrees    T Axis 13 degrees   CBC Auto Differential    Collection Time: 07/06/20 10:25 PM   Result Value Ref Range    WBC 8.4 3.5 - Plasma    Collection Time: 07/06/20 10:25 PM   Result Value Ref Range    Lactic Acid 1.1 0.5 - 2.2 mmol/L    Lactic Acid, Whole Blood NOT REPORTED 0.7 - 2.1 mmol/L   C-Reactive Protein    Collection Time: 07/06/20 10:25 PM   Result Value Ref Range    CRP 40.4 (H) 0.0 - 5.0 mg/L   Sedimentation Rate    Collection Time: 07/06/20 10:25 PM   Result Value Ref Range    Sed Rate 10 0 - 20 mm   FERRITIN    Collection Time: 07/06/20 10:25 PM   Result Value Ref Range    Ferritin 157 30 - 400 ug/L   Procalcitonin    Collection Time: 07/06/20 10:25 PM   Result Value Ref Range    Procalcitonin 0.10 (H) <0.09 ng/mL   Hepatic Function Panel    Collection Time: 07/06/20 10:25 PM   Result Value Ref Range    Alb 4.2 3.5 - 5.2 g/dL    Alkaline Phosphatase 58 40 - 129 U/L    ALT 16 5 - 41 U/L    AST 31 <40 U/L    Total Bilirubin 1.12 0.3 - 1.2 mg/dL    Bilirubin, Direct 0.23 <0.31 mg/dL    Bilirubin, Indirect 0.89 0.00 - 1.00 mg/dL    Total Protein 7.4 6.4 - 8.3 g/dL    Globulin NOT REPORTED 1.5 - 3.8 g/dL    Albumin/Globulin Ratio 1.3 1.0 - 2.5   EKG 12 Lead    Collection Time: 07/06/20 11:07 PM   Result Value Ref Range    Ventricular Rate 104 BPM    Atrial Rate 104 BPM    P-R Interval 200 ms    QRS Duration 148 ms    Q-T Interval 380 ms    QTc Calculation (Bazett) 499 ms    P Axis 51 degrees    R Axis -11 degrees    T Axis 40 degrees   Troponin    Collection Time: 07/07/20 12:33 AM   Result Value Ref Range    Troponin, High Sensitivity 773 (HH) 0 - 22 ng/L    Troponin T NOT REPORTED <0.03 ng/mL    Troponin Interp NOT REPORTED    Culture, Blood 1    Collection Time: 07/07/20 12:33 AM   Result Value Ref Range    Specimen Description . BLOOD     Special Requests 15MLS RIGHT HAND     Culture NO GROWTH 9 HOURS    Culture, Blood 1    Collection Time: 07/07/20 12:37 AM   Result Value Ref Range    Specimen Description . BLOOD     Special Requests 20MLS LEFT AC     Culture NO GROWTH 9 HOURS    EKG 12 Lead    Collection Time: 07/07/20  1:49 AM Result Value Ref Range    Ventricular Rate 121 BPM    Atrial Rate 277 BPM    QRS Duration 142 ms    Q-T Interval 380 ms    QTc Calculation (Bazett) 539 ms    P Axis 33 degrees    R Axis -14 degrees    T Axis 38 degrees   COVID-19    Collection Time: 07/07/20  2:08 AM   Result Value Ref Range    SARS-CoV-2          SARS-CoV-2, Rapid Not Detected Not Detected    Source . NASOPHARYNGEAL SWAB     SARS-CoV-2, PCR         Troponin    Collection Time: 07/07/20  3:49 AM   Result Value Ref Range    Troponin, High Sensitivity 883 (HH) 0 - 22 ng/L    Troponin T NOT REPORTED <0.03 ng/mL    Troponin Interp NOT REPORTED    EKG 12 Lead    Collection Time: 07/07/20  7:38 AM   Result Value Ref Range    Ventricular Rate 102 BPM    Atrial Rate 104 BPM    P-R Interval 216 ms    QRS Duration 144 ms    Q-T Interval 378 ms    QTc Calculation (Bazett) 492 ms    P Axis 54 degrees    R Axis -16 degrees    T Axis 43 degrees   Troponin    Collection Time: 07/07/20  7:45 AM   Result Value Ref Range    Troponin, High Sensitivity 950 (HH) 0 - 22 ng/L    Troponin T NOT REPORTED <0.03 ng/mL    Troponin Interp NOT REPORTED    CBC    Collection Time: 07/07/20  5:18 PM   Result Value Ref Range    WBC 9.7 3.5 - 11.3 k/uL    RBC 4.41 4.21 - 5.77 m/uL    Hemoglobin 11.7 (L) 13.0 - 17.0 g/dL    Hematocrit 38.1 (L) 40.7 - 50.3 %    MCV 86.4 82.6 - 102.9 fL    MCH 26.5 25.2 - 33.5 pg    MCHC 30.7 28.4 - 34.8 g/dL    RDW 16.2 (H) 11.8 - 14.4 %    Platelets 442 768 - 737 k/uL    MPV 9.0 8.1 - 13.5 fL    NRBC Automated 0.0 0.0 per 100 WBC   APTT    Collection Time: 07/07/20  5:18 PM   Result Value Ref Range    PTT 44.5 (H) 23.9 - 33.8 sec   Protime-INR    Collection Time: 07/07/20  5:18 PM   Result Value Ref Range    Protime 14.9 (H) 11.5 - 14.2 sec    INR 1.2        Imaging/Diagnostics:  Xr Chest Portable    Result Date: 7/7/2020  Large left pleural effusion with adjacent airspace disease/atelectasis.      Ct Chest Pulmonary Embolism W Contrast    Result needed    Consultations:   IP CONSULT TO CARDIOLOGY  IP CONSULT TO PULMONOLOGY  IP CONSULT TO PULMONOLOGY    Patient is admitted as inpatient status because of co-morbidities listed above, severity of signs and symptoms as outlined, requirement for current medical therapies and most importantly because of direct risk to patient if care not provided in a hospital setting.     Leno Brooks, APRN - NP  7/7/2020  5:40 PM    Copy sent to Dr. Otis Tate MD

## 2020-07-08 ENCOUNTER — APPOINTMENT (OUTPATIENT)
Dept: GENERAL RADIOLOGY | Age: 70
DRG: 175 | End: 2020-07-08
Attending: FAMILY MEDICINE
Payer: MEDICARE

## 2020-07-08 ENCOUNTER — APPOINTMENT (OUTPATIENT)
Dept: INTERVENTIONAL RADIOLOGY/VASCULAR | Age: 70
DRG: 175 | End: 2020-07-08
Attending: FAMILY MEDICINE
Payer: MEDICARE

## 2020-07-08 PROBLEM — J96.01 ACUTE RESPIRATORY FAILURE WITH HYPOXIA (HCC): Status: ACTIVE | Noted: 2020-07-07

## 2020-07-08 LAB
ALBUMIN SERPL-MCNC: 3.5 G/DL (ref 3.5–5.2)
ALBUMIN/GLOBULIN RATIO: ABNORMAL (ref 1–2.5)
ALP BLD-CCNC: 52 U/L (ref 40–129)
ALT SERPL-CCNC: 9 U/L (ref 5–41)
ANION GAP SERPL CALCULATED.3IONS-SCNC: 13 MMOL/L (ref 9–17)
ANTI-XA UNFRAC HEPARIN: 0.38 IU/L (ref 0.3–0.7)
ANTI-XA UNFRAC HEPARIN: 0.43 IU/L (ref 0.3–0.7)
ANTI-XA UNFRAC HEPARIN: 0.92 IU/L (ref 0.3–0.7)
AST SERPL-CCNC: 18 U/L
BILIRUB SERPL-MCNC: 1.01 MG/DL (ref 0.3–1.2)
BUN BLDV-MCNC: 17 MG/DL (ref 8–23)
BUN/CREAT BLD: 15 (ref 9–20)
CALCIUM SERPL-MCNC: 8.6 MG/DL (ref 8.6–10.4)
CHLORIDE BLD-SCNC: 95 MMOL/L (ref 98–107)
CHOLESTEROL/HDL RATIO: 2.7
CHOLESTEROL: 119 MG/DL
CO2: 24 MMOL/L (ref 20–31)
CREAT SERPL-MCNC: 1.14 MG/DL (ref 0.7–1.2)
EKG ATRIAL RATE: 90 BPM
EKG Q-T INTERVAL: 374 MS
EKG QRS DURATION: 142 MS
EKG QTC CALCULATION (BAZETT): 535 MS
EKG R AXIS: 5 DEGREES
EKG T AXIS: 21 DEGREES
EKG VENTRICULAR RATE: 123 BPM
GFR AFRICAN AMERICAN: >60 ML/MIN
GFR NON-AFRICAN AMERICAN: >60 ML/MIN
GFR SERPL CREATININE-BSD FRML MDRD: ABNORMAL ML/MIN/{1.73_M2}
GFR SERPL CREATININE-BSD FRML MDRD: ABNORMAL ML/MIN/{1.73_M2}
GLUCOSE BLD-MCNC: 192 MG/DL (ref 70–99)
GLUCOSE, FLUID: 113 MG/DL
HCT VFR BLD CALC: 37.6 % (ref 40.7–50.3)
HDLC SERPL-MCNC: 44 MG/DL
HEMOGLOBIN: 11.6 G/DL (ref 13–17)
LACTATE DEHYDROGENASE, FLUID: 421 U/L
LDL CHOLESTEROL: 60 MG/DL (ref 0–130)
MAGNESIUM: 2.1 MG/DL (ref 1.6–2.6)
MCH RBC QN AUTO: 26.7 PG (ref 25.2–33.5)
MCHC RBC AUTO-ENTMCNC: 30.9 G/DL (ref 28.4–34.8)
MCV RBC AUTO: 86.6 FL (ref 82.6–102.9)
NRBC AUTOMATED: 0 PER 100 WBC
PDW BLD-RTO: 16 % (ref 11.8–14.4)
PLATELET # BLD: 211 K/UL (ref 138–453)
PMV BLD AUTO: 9.7 FL (ref 8.1–13.5)
POTASSIUM SERPL-SCNC: 4.5 MMOL/L (ref 3.7–5.3)
RBC # BLD: 4.34 M/UL (ref 4.21–5.77)
SODIUM BLD-SCNC: 132 MMOL/L (ref 135–144)
SPECIMEN TYPE: NORMAL
TOTAL PROTEIN, BODY FLUID: 4 G/DL
TOTAL PROTEIN: 6.6 G/DL (ref 6.4–8.3)
TRIGL SERPL-MCNC: 73 MG/DL
VLDLC SERPL CALC-MCNC: NORMAL MG/DL (ref 1–30)
WBC # BLD: 7.3 K/UL (ref 3.5–11.3)

## 2020-07-08 PROCEDURE — 80061 LIPID PANEL: CPT

## 2020-07-08 PROCEDURE — 2500000003 HC RX 250 WO HCPCS: Performed by: NURSE PRACTITIONER

## 2020-07-08 PROCEDURE — 87015 SPECIMEN INFECT AGNT CONCNTJ: CPT

## 2020-07-08 PROCEDURE — 2700000000 HC OXYGEN THERAPY PER DAY

## 2020-07-08 PROCEDURE — 88342 IMHCHEM/IMCYTCHM 1ST ANTB: CPT

## 2020-07-08 PROCEDURE — 6360000002 HC RX W HCPCS: Performed by: NURSE PRACTITIONER

## 2020-07-08 PROCEDURE — 94660 CPAP INITIATION&MGMT: CPT

## 2020-07-08 PROCEDURE — 87070 CULTURE OTHR SPECIMN AEROBIC: CPT

## 2020-07-08 PROCEDURE — 2580000003 HC RX 258: Performed by: NURSE PRACTITIONER

## 2020-07-08 PROCEDURE — 6360000002 HC RX W HCPCS: Performed by: INTERNAL MEDICINE

## 2020-07-08 PROCEDURE — 82945 GLUCOSE OTHER FLUID: CPT

## 2020-07-08 PROCEDURE — 84157 ASSAY OF PROTEIN OTHER: CPT

## 2020-07-08 PROCEDURE — 87075 CULTR BACTERIA EXCEPT BLOOD: CPT

## 2020-07-08 PROCEDURE — 94640 AIRWAY INHALATION TREATMENT: CPT

## 2020-07-08 PROCEDURE — 87206 SMEAR FLUORESCENT/ACID STAI: CPT

## 2020-07-08 PROCEDURE — 88112 CYTOPATH CELL ENHANCE TECH: CPT

## 2020-07-08 PROCEDURE — 87116 MYCOBACTERIA CULTURE: CPT

## 2020-07-08 PROCEDURE — APPSS45 APP SPLIT SHARED TIME 31-45 MINUTES: Performed by: NURSE PRACTITIONER

## 2020-07-08 PROCEDURE — 6370000000 HC RX 637 (ALT 250 FOR IP): Performed by: INTERNAL MEDICINE

## 2020-07-08 PROCEDURE — 83615 LACTATE (LD) (LDH) ENZYME: CPT

## 2020-07-08 PROCEDURE — 85027 COMPLETE CBC AUTOMATED: CPT

## 2020-07-08 PROCEDURE — 85520 HEPARIN ASSAY: CPT

## 2020-07-08 PROCEDURE — 88341 IMHCHEM/IMCYTCHM EA ADD ANTB: CPT

## 2020-07-08 PROCEDURE — 2060000000 HC ICU INTERMEDIATE R&B

## 2020-07-08 PROCEDURE — 80053 COMPREHEN METABOLIC PANEL: CPT

## 2020-07-08 PROCEDURE — 2580000003 HC RX 258: Performed by: INTERNAL MEDICINE

## 2020-07-08 PROCEDURE — 71045 X-RAY EXAM CHEST 1 VIEW: CPT

## 2020-07-08 PROCEDURE — 88305 TISSUE EXAM BY PATHOLOGIST: CPT

## 2020-07-08 PROCEDURE — 93005 ELECTROCARDIOGRAM TRACING: CPT | Performed by: NURSE PRACTITIONER

## 2020-07-08 PROCEDURE — 83735 ASSAY OF MAGNESIUM: CPT

## 2020-07-08 PROCEDURE — 94761 N-INVAS EAR/PLS OXIMETRY MLT: CPT

## 2020-07-08 PROCEDURE — C1729 CATH, DRAINAGE: HCPCS

## 2020-07-08 PROCEDURE — 32555 ASPIRATE PLEURA W/ IMAGING: CPT

## 2020-07-08 PROCEDURE — 89051 BODY FLUID CELL COUNT: CPT

## 2020-07-08 PROCEDURE — 87205 SMEAR GRAM STAIN: CPT

## 2020-07-08 PROCEDURE — 6370000000 HC RX 637 (ALT 250 FOR IP): Performed by: NURSE PRACTITIONER

## 2020-07-08 PROCEDURE — 99232 SBSQ HOSP IP/OBS MODERATE 35: CPT | Performed by: FAMILY MEDICINE

## 2020-07-08 PROCEDURE — 36415 COLL VENOUS BLD VENIPUNCTURE: CPT

## 2020-07-08 PROCEDURE — 0W9B3ZZ DRAINAGE OF LEFT PLEURAL CAVITY, PERCUTANEOUS APPROACH: ICD-10-PCS | Performed by: RADIOLOGY

## 2020-07-08 RX ORDER — METOPROLOL TARTRATE 5 MG/5ML
5 INJECTION INTRAVENOUS EVERY 4 HOURS PRN
Status: DISCONTINUED | OUTPATIENT
Start: 2020-07-08 | End: 2020-07-11 | Stop reason: HOSPADM

## 2020-07-08 RX ORDER — DOCUSATE SODIUM 100 MG/1
100 CAPSULE, LIQUID FILLED ORAL 2 TIMES DAILY PRN
Status: DISCONTINUED | OUTPATIENT
Start: 2020-07-08 | End: 2020-07-11 | Stop reason: HOSPADM

## 2020-07-08 RX ORDER — METHYLPREDNISOLONE SODIUM SUCCINATE 40 MG/ML
40 INJECTION, POWDER, LYOPHILIZED, FOR SOLUTION INTRAMUSCULAR; INTRAVENOUS EVERY 12 HOURS
Status: DISCONTINUED | OUTPATIENT
Start: 2020-07-08 | End: 2020-07-09

## 2020-07-08 RX ADMIN — Medication 3 ML: at 07:55

## 2020-07-08 RX ADMIN — FAMOTIDINE 20 MG: 20 TABLET, FILM COATED ORAL at 08:15

## 2020-07-08 RX ADMIN — HEPARIN SODIUM 16 UNITS/KG/HR: 10000 INJECTION, SOLUTION INTRAVENOUS at 23:39

## 2020-07-08 RX ADMIN — METHYLPREDNISOLONE SODIUM SUCCINATE 60 MG: 125 INJECTION, POWDER, FOR SOLUTION INTRAMUSCULAR; INTRAVENOUS at 08:15

## 2020-07-08 RX ADMIN — HEPARIN SODIUM 16 UNITS/KG/HR: 10000 INJECTION, SOLUTION INTRAVENOUS at 05:15

## 2020-07-08 RX ADMIN — LEVALBUTEROL 1.25 MG: 1.25 SOLUTION, CONCENTRATE RESPIRATORY (INHALATION) at 19:52

## 2020-07-08 RX ADMIN — LEVALBUTEROL 1.25 MG: 1.25 SOLUTION, CONCENTRATE RESPIRATORY (INHALATION) at 07:55

## 2020-07-08 RX ADMIN — Medication 3 ML: at 11:42

## 2020-07-08 RX ADMIN — FAMOTIDINE 20 MG: 20 TABLET, FILM COATED ORAL at 20:24

## 2020-07-08 RX ADMIN — LEVALBUTEROL 1.25 MG: 1.25 SOLUTION, CONCENTRATE RESPIRATORY (INHALATION) at 15:29

## 2020-07-08 RX ADMIN — SODIUM CHLORIDE, PRESERVATIVE FREE 10 ML: 5 INJECTION INTRAVENOUS at 08:23

## 2020-07-08 RX ADMIN — LEVALBUTEROL 1.25 MG: 1.25 SOLUTION, CONCENTRATE RESPIRATORY (INHALATION) at 11:42

## 2020-07-08 RX ADMIN — METHYLPREDNISOLONE SODIUM SUCCINATE 60 MG: 125 INJECTION, POWDER, FOR SOLUTION INTRAMUSCULAR; INTRAVENOUS at 02:48

## 2020-07-08 RX ADMIN — METOPROLOL TARTRATE 5 MG: 5 INJECTION, SOLUTION INTRAVENOUS at 16:06

## 2020-07-08 RX ADMIN — ASPIRIN 325 MG: 325 TABLET ORAL at 08:15

## 2020-07-08 RX ADMIN — SODIUM CHLORIDE, PRESERVATIVE FREE 10 ML: 5 INJECTION INTRAVENOUS at 22:02

## 2020-07-08 RX ADMIN — METOPROLOL TARTRATE 25 MG: 25 TABLET, FILM COATED ORAL at 20:24

## 2020-07-08 RX ADMIN — AZITHROMYCIN MONOHYDRATE 500 MG: 500 INJECTION, POWDER, LYOPHILIZED, FOR SOLUTION INTRAVENOUS at 02:47

## 2020-07-08 RX ADMIN — ATORVASTATIN CALCIUM 80 MG: 80 TABLET, FILM COATED ORAL at 20:24

## 2020-07-08 RX ADMIN — METHYLPREDNISOLONE SODIUM SUCCINATE 40 MG: 40 INJECTION, POWDER, FOR SOLUTION INTRAMUSCULAR; INTRAVENOUS at 20:21

## 2020-07-08 RX ADMIN — CEFTRIAXONE SODIUM 1 G: 1 INJECTION, POWDER, FOR SOLUTION INTRAMUSCULAR; INTRAVENOUS at 02:43

## 2020-07-08 RX ADMIN — METOPROLOL TARTRATE 25 MG: 25 TABLET, FILM COATED ORAL at 08:15

## 2020-07-08 ASSESSMENT — PAIN SCALES - GENERAL: PAINLEVEL_OUTOF10: 0

## 2020-07-08 NOTE — BRIEF OP NOTE
Brief Postoperative Note    Anna Marie Yee  YOB: 1950  [id number]    Pre-operative Diagnosis: Left pleural effusion     Post-operative Diagnosis: Same    Procedure: Left thoracentesis    Anesthesia: Local    Surgeons/Assistants: Dr. Maria Antonia Lynch    Estimated Blood Loss: less than 50     Complications: None    Specimens: Was Obtained: 1L brown/red fluid sent to lab    Findings: Successful thoracentesis of 1.5L brown/red pleural fluid. CXR pending.     Electronically signed by Christopher Mcguire MD on 7/8/2020 at 3:12 PM

## 2020-07-08 NOTE — CONSULTS
[facility]    Consult                        Today's Date: 7/8/2020  Patient Name: Eunice Pierce  Date of admission: 7/7/2020  4:24 PM  Patient's age: 68 y.o., 1950  Admission Dx: Pulmonary embolism during current hospitalization ([facility] [address] [I26.99]    Reason for Consult:  Cardiac evaluation    Requesting Physician: Chevy Garza MD    CHIEF COMPLAINT:  Shortness of breath    History Obtained From:  patient, electronic medical record    HISTORY OF PRESENT ILLNESS:      The patient is a 68 y.o.  male who is admitted to the hospital for Shortness of breath    Pt. Presented to [facility] ER with c/o shortness of breath. Progressive for past several days. CT at outline facility showed multiple PE in the RLL. Also noted to have elevated troponin which have peaked at 950 and are coming down. Denies any chest pain. States once in a while when he could cough he would have some \"pressure\" but he denies any other instances of chest pain. Denies any prior cardiovascular testing or work-up that he can recall. States he has never seen a cardiologist as OP and when he had his \"episode of Afib last year when I had influenza I never followed up with anyone. \" He states he was on Eliquis and \"one other medication that I cannot remember\" but states his PCP (Dr. Angelina Damico in [address] [place] [address]) stopped both of these in about May of this year. He denies any concerns up until this last week with the shortness of breath. Does admit to episodes of feeling like he was going to \"pass out at times when I am standing up\" but denies any specific syncopal episodes. Past Medical History:   has a past medical history of Afib ([facility] [address] COPD (chronic obstructive pulmonary disease) ([facility] [address] and Influenza A. Past Surgical History:   has no past surgical history on file. Home Medications:    Prior to Admission medications    Medication Sig Start Date End Date Taking?  Authorizing Provider   aspirin 325 MG EC tablet Take 1 tablet by mouth daily 7/8/20   DONALD Varner - NP   nitroGLYCERIN (NITROSTAT) 0.4 MG SL tablet up to max of 3 total doses. If no relief after 1 dose, call 911. 7/7/20   DONALD Varner - NP   Heparin Sod, Porcine, in D5W (HEPARIN, PORCINE,) 100 UNIT/ML SOLN infusion Infuse 2,064.6 Units/hr intravenously continuous 7/7/20   DONALD Varner - NP   promethazine (PHENERGAN) 12.5 MG tablet Take 1 tablet by mouth every 6 hours as needed for Nausea 7/7/20   Matthew Pang APRN - NAE   ondansetron [facility]) 4 MG/2ML injection Infuse 2 mLs intravenously every 6 hours as needed for Nausea or Vomiting 7/7/20   Matthew Pang APRN - NP   atorvastatin (LIPITOR) 80 MG tablet Take 1 tablet by mouth nightly 7/7/20   Matthew Pang APRN - NAE   metoprolol tartrate (LOPRESSOR) 25 MG tablet Take 1 tablet by mouth 2 times daily 7/7/20   Matthew Pang APRN - NP   famotidine (PEPCID) 20 MG tablet Take 1 tablet by mouth 2 times daily 7/7/20   Matthew Pang APRN - NAE   fluticasone-umeclidin-vilant (TRELEGY ELLIPTA) 100-62.5-25 MCG/INH AEPB Inhale 1 puff into the lungs daily    Historical Provider, MD       Allergies:  Patient has no known allergies. Social History:   reports that he quit smoking about 18 months ago. He has a 37.50 pack-year smoking history. He has never used smokeless tobacco. He reports previous alcohol use of about 4.0 standard drinks of alcohol per week. He reports that he does not use drugs. Family History: family history includes Heart Disease in his father; No Known Problems in his brother, brother, and mother. No h/o sudden cardiac death. No for premature CAD    REVIEW OF SYSTEMS:    · Constitutional: there has been no unanticipated weight loss. There's been No change in energy level, No change in activity level. · Eyes: No visual changes or diplopia. No scleral icterus. · ENT: No Headaches, hearing loss or vertigo. No mouth sores or sore throat.   · Cardiovascular: see above  · Respiratory: see above  · Gastrointestinal: No abdominal pain, appetite loss, blood in stools. · Genitourinary: No dysuria, trouble voiding, or hematuria. · Musculoskeletal:  No gait disturbance, No weakness or joint complaints. · Integumentary: No rash or pruritis. · Neurological: No headache or diplopia. No tingling  · Psychiatric: No anxiety, or depression. · Endocrine: No temperature intolerance. · Hematologic/Lymphatic: No abnormal bruising or bleeding, blood clots or swollen lymph nodes. · Allergic/Immunologic: No nasal congestion or hives. PHYSICAL EXAM:      /74   Pulse 111   Temp 97.7 °F (36.5 °C) (Temporal)   Resp 20   Ht 5' 10\" (1.778 m)   Wt 245 lb (111.1 kg)   SpO2 92%   BMI 35.15 kg/m²    Constitutional and General Appearance: alert, cooperative, no distress and appears stated age  HEENT: PERRL, no cervical lymphadenopathy. No masses palpable. Normal oral mucosa  Respiratory:  · Normal excursion and expansion without use of accessory muscles at present. On NC oxygen  Without distress  · Resp Auscultation: No for increased work of breathing. ·  On auscultation: diminished throughout with expiratory wheezing radha LLL and b/l anterior upper lobes. No audible rales presently  Cardiovascular:  · Heart tones are crisp and normal. Irregular rate. · Tele Afib 110s  Abdomen:   · soft  · Bowel sounds present. Obese  Extremities:  ·  No LE edema  Neurological:  · Alert and oriented. DATA:    Diagnostics:    EKG: normal EKG, normal sinus rhythm, atrial fibrillation, rate 110s  ECHO: obtained and reviewed. Ejection fraction: 55%  Stress Test: not obtained. Cardiac Angiography: not obtained. Echo 7/7/2020  Summary  Mildly dilated left ventricle with moderately reduced function of 35-40%. Left atrium is mildly dilated. Aortic valve not well visualized but appears normal in structure and  function without stenosis or regurgitation.   large pleural effusion noted    CT chest 7/7/20  Impression Right distal lobar and right lower lobe segmental emboli.       Bowing of the interventricular septum worrisome for heart strain unclear if   this is due to the right-sided emboli versus the other additional findings   within the lungs.       Moderate to large left-sided effusion with left basilar opacities suggestive   of areas of compressive atelectasis.       Right paratracheal mass and right hilar adenopathy.  Please correlate with   any potential biopsy results and history (previous exam recommended biopsy). This is otherwise unchanged in size from prior exam.         Labs:     CBC:   Recent Labs     07/07/20 1718 07/08/20 0452   WBC 9.7 7.3   HGB 11.7* 11.6*   HCT 38.1* 37.6*    211     BMP:   Recent Labs     07/06/20 2225 07/08/20 0452   * 132*   K 4.4 4.5   CO2 25 24   BUN 13 17   CREATININE 0.97 1.14   LABGLOM >60 >60   GLUCOSE 89 192*     BNP: No results for input(s): BNP in the last 72 hours. PT/INR:   Recent Labs     07/07/20 1718   PROTIME 14.9*   INR 1.2     APTT:  Recent Labs     07/07/20 1718   APTT 44.5*     CARDIAC ENZYMES:No results for input(s): CKTOTAL, CKMB, CKMBINDEX, TROPONINI in the last 72 hours. FASTING LIPID PANEL:  Lab Results   Component Value Date    HDL 44 07/08/2020    TRIG 73 07/08/2020     LIVER PROFILE:  Recent Labs     07/06/20 2225 07/08/20 0452   AST 31 18   ALT 16 9   LABALBU 4.2 3.5       IMPRESSION:    Patient Active Problem List   Diagnosis    NSTEMI (non-ST elevated myocardial infarction) ([facility] [address]    PE (pulmonary thromboembolism) (HCC)    Atrial flutter (HCC)    Chronic obstructive pulmonary disease (HCC)    Hypertriglyceridemia    Hypoxia    Paroxysmal atrial fibrillation (HCC)    Prediabetes    Acute respiratory failure with hypoxia (HCC)    Sciatica    Obesity (BMI 30-39. 9)    Pulmonary embolism during current hospitalization ([facility] [address]    COPD (chronic obstructive pulmonary disease) ([facility] [address]       RECOMMENDATIONS:  1.  Acute Pulmonary

## 2020-07-08 NOTE — PROGRESS NOTES
Pulmonary Critical Care Progress Note  Jovita Jaeger CNP / Dr. Kaye Deng M.D. Patient seen for the follow up of paratracheal mass PE (pulmonary thromboembolism) ([address]     Subjective:  He is resting in bed, in no obvious distress. His shortness of breath is improved since yesterday, feeling fairly comfortable. He denies significant cough. No complaints of chest pain. No acute events noted overnight. He did wear BiPAP throughout the night last night. His HR at this time is 140, being seen by cardiology. Length of stay: 1 Days    Examination:  Vitals: /74   Pulse 111   Temp 97.7 °F (36.5 °C) (Temporal)   Resp 20   Ht 5' 10\" (1.778 m)   Wt 245 lb (111.1 kg)   SpO2 92%   BMI 35.15 kg/m²     General appearance: alert and cooperative with exam, sitting in bed in no distress  Neck: No JVD  Lungs: Moderate air exchange, decreased on the left, no significant wheeze  Heart: regular rate and rhythm, S1, S2 normal, no gallop  Abdomen: Distended abdomen/tympanic  Extremities: no cyanosis or clubbing.  No significant edema    LABs:  CBC:   Recent Labs     07/07/20 1718 07/08/20 0452   WBC 9.7 7.3   HGB 11.7* 11.6*   HCT 38.1* 37.6*    211     BMP:   Recent Labs     07/06/20 2225 07/08/20  0452   * 132*   K 4.4 4.5   CO2 25 24   BUN 13 17   CREATININE 0.97 1.14   LABGLOM >60 >60   GLUCOSE 89 192*     PT/INR:   Recent Labs     07/07/20 1718   PROTIME 14.9*   INR 1.2     APTT:  Recent Labs     07/07/20 1718   APTT 44.5*     LIVER PROFILE:  Recent Labs     07/06/20 2225 07/08/20  0452   AST 31 18   ALT 16 9   LABALBU 4.2 3.5     Radiology:  7/8/2020      Impression:  · Acute hypoxic respiratory insufficiency  · Small acute subsegmental pulmonary embolism  · Large left pleural effusion  · 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 fib    Recommendations:  · Oxygen by nasal cannula  · BiPAP for sleep and as needed  · Heparin drip for PE/elevated troponin  · Continue IV Rocephin/Zithromax for now for COPD exacerbation   · Xopenex aerosols every 4 hours   · Trelegy Ellipta 100  · Decrease dose and frequency of IV solu-medrol, 40 mg every 12 hours  · Cardiology consult, HR control  · Await fluid analysis  · DVT prophylaxis with low molecular weight heparin  · Incentive spirometry every hour while awake  · Pending fluid analysis patient will be considered PET vs EBUS as outpatient   · Discussed with RN  · Will follow with you    Electronically signed by     DONALD Lomax CNP on 7/8/2020 at 2:21 PM  Patient was seen under the supervision of Dr. Sabas Dickey and Sleep Medicine,    Patient seen and evaluated by me. He has returned from thoracentesis which yielded 1500 mL of strawberry colored fluid. His shortness of breath has improved. He denies significant cough, no chest pain. His heart rate remains elevated at 140, and has been evaluated by cardiology. Moderate air exchange, decreased on the left. Plan is to continue with heparin drip for pulmonary embolism and elevated troponins. We will continue empiric antibiotics at this time. Continue with bronchodilators, decrease Solu-Medrol. Above was reviewed and discussed with Donald Lobo CNP. And I agree with assessment and plan of care.   Electronically signed by     Tami Bourgeois MD on 7/8/2020 at 3:52 PM  Pulmonary Critical Care and Sleep Medicine,  [facility]  Cell: [phone]  Office: [phone]

## 2020-07-08 NOTE — PROGRESS NOTES
for today. We await cardiology input. Review of Systems:     Constitutional:  Positive for activity change and fatigue. Negative for fever. Respiratory:  Positive for cough, chest tightness, shortness of breath and wheezing. Cardiovascular:  negative for chest pain, chest pressure/discomfort, lower extremity edema, palpitations  Gastrointestinal:  negative for abdominal pain, constipation, diarrhea, nausea, vomiting  Neurological:  negative for dizziness, headache    Medications: Allergies:  No Known Allergies    Current Meds:   Scheduled Meds:    fluticasone-umeclidin-vilant  1 puff Inhalation Daily    sodium chloride flush  10 mL Intravenous 2 times per day    famotidine  20 mg Oral BID    atorvastatin  80 mg Oral Nightly    metoprolol tartrate  25 mg Oral BID    aspirin  325 mg Oral Daily    azithromycin  500 mg Intravenous Q24H    cefTRIAXone (ROCEPHIN) IV  1 g Intravenous Q24H    methylPREDNISolone  60 mg Intravenous Q8H    levalbuterol  1.25 mg Nebulization Q4H WA     Continuous Infusions:    heparin (porcine) 16 Units/kg/hr (07/08/20 0600)    dexmedetomidine (PRECEDEX) IV infusion Stopped (07/08/20 0500)     PRN Meds: sodium chloride flush, potassium chloride **OR** potassium alternative oral replacement **OR** potassium chloride, magnesium sulfate, acetaminophen **OR** acetaminophen, magnesium hydroxide, promethazine **OR** ondansetron, perflutren lipid microspheres, heparin (porcine), heparin (porcine), sodium chloride nebulizer, dexmedetomidine (PRECEDEX) IV infusion    Data:     Past Medical History:   has a past medical history of Afib ([facility] [address] COPD [facility]chronic obstructive pulmonary disease) ([facility] and Influenza A. Social History:   reports that he quit smoking about 18 months ago. He has a 37.50 pack-year smoking history. He has never used smokeless tobacco. He reports previous alcohol use of about 4.0 standard drinks of alcohol per week. He reports that he does not use drugs. BILITOT 1.12  --  1.01   BILIDIR 0.23  --   --    CHOL  --   --  119   HDL  --   --  44   LDLCHOLESTEROL  --   --  60   CHOLHDLRATIO  --   --  2.7   TRIG  --   --  73   VLDL  --   --  NOT REPORTED     ABG:No results found for: POCPH, PHART, PH, POCPCO2, MBR3FTO, PCO2, POCPO2, PO2ART, PO2, POCHCO3, QWO0HJY, HCO3, NBEA, PBEA, BEART, BE, THGBART, THB, PUY8VSB, CTJH0WFQ, Z3QYLVBW, O2SAT, FIO2  Lab Results   Component Value Date/Time    SPECIAL 20MLS LEFT [facility] 07/07/2020 12:37 AM     Lab Results   Component Value Date/Time    CULTURE NO GROWTH 1 DAY 07/07/2020 12:37 AM       Radiology:  Manny Banegas Chest Portable    Result Date: 7/7/2020  Large left pleural effusion with adjacent airspace disease/atelectasis. Xr Chest 1 Vw    Result Date: 7/8/2020  No significant change in moderate size left pleural effusion and probable right basilar atelectasis. Unchanged appearance of fullness in the superior mediastinum, corresponding to lymphadenopathy. Ct Chest Pulmonary Embolism W Contrast    Result Date: 7/7/2020  Right distal lobar and right lower lobe segmental emboli. Bowing of the interventricular septum worrisome for heart strain unclear if this is due to the right-sided emboli versus the other additional findings within the lungs. Moderate to large left-sided effusion with left basilar opacities suggestive of areas of compressive atelectasis. Right paratracheal mass and right hilar adenopathy. Please correlate with any potential biopsy results and history (previous exam recommended biopsy). This is otherwise unchanged in size from prior exam.     Us Dup Lower Extremity Left Bryan    Result Date: 7/7/2020  No convincing evidence of DVT in the left lower extremity. Us Dup Lower Extremity Right Bryan    Result Date: 7/7/2020  No convincing evidence of DVT in the right lower extremity.        Physical Examination:        General appearance:  alert, cooperative and no distress  Mental Status:  oriented to person, place and time and normal affect  Lungs:  clear to auscultation bilaterally, normal effort  Heart:  regular rate and rhythm, no murmur  Abdomen:  soft, nontender, nondistended, normal bowel sounds, no masses, hepatomegaly, splenomegaly  Extremities:  no edema, redness, tenderness in the calves  Skin:  no gross lesions, rashes, induration    Assessment:        Hospital Problems           Last Modified POA    * (Principal) PE (pulmonary thromboembolism) ([address] 7/8/2020 Yes    NSTEMI (non-ST elevated myocardial infarction) ([address] 7/8/2020 Yes    Chronic obstructive pulmonary disease ([address] 7/7/2020 Yes    Hypertriglyceridemia 7/7/2020 Yes    Paroxysmal atrial fibrillation ([address] 7/7/2020 Yes    Acute respiratory failure with hypoxia ([address] 7/8/2020 Yes    Pulmonary embolism during current hospitalization ([address] 7/7/2020 Yes    COPD (chronic obstructive pulmonary disease) ([address] 7/7/2020 Yes          Plan:        1. Continue supplemental oxygen and BiPAP as needed  2. Continue heparin drip  3. Appreciate cardiology input on the need for intervention versus medical management  4. Pulmonary consult  5. Thoracentesis by IR today with fluid studies to follow  6.  Continue IV antibiotics as well as steroids for COPD exacerbation    DONALD Escobar NP  7/8/2020  8:38 AM

## 2020-07-08 NOTE — PROGRESS NOTES
Pt tolerated procedure without distress. 1500 ml of dark red pleural fluid removed and collected for labs pcxr completed  Dressing applied to procedure site. pt returned to room in nad

## 2020-07-08 NOTE — CARE COORDINATION
Case Management Initial Discharge Plan  Cecilia Patient,         Readmission Risk              Risk of Unplanned Readmission:        14             Met with:patient to discuss discharge plans. Information verified: address, contacts, phone number, , insurance Yes  PCP: Lizbeth Florian MD  Date of last visit: 4 months    Insurance Provider: [employer]    Discharge Planning  Current Residence:  Private residence  Living Arrangements:      Home has 1 stories/ stairs to climb  Support Systems:     Current Services PTA:   Agency:   Patient able to perform ADL's:Independent  DME in home:  none  DME used to aid ambulation prior to admission:     DME used during admission:      Potential Assistance Needed:       [facility]: [facility]   Potential Assistance Purchasing Medications:     Does patient want to participate in local refill/ meds to beds program?  Yes    Patient agreeable to home care: No  [place] of choice provided:  n/a      Type of Home Care Services:     Patient expects to be discharged to:       Prior SNF/Rehab Placement and Facility:   Agreeable to SNF/Rehab: No  [place] of choice provided: n/a   Evaluation: no    Expected Discharge date: Follow Up Appointment: Best Day/ Time:      Transportation provider:   Transportation arrangements needed for discharge: No    Discharge Plan: Met with patient at bedside to discuss dc plan. He resides at home with his wife, son and grandson. Plan is for patient to return home with family support.            Electronically signed by KENYATTA Ralph on 20 at 2:54 PM EDT

## 2020-07-08 NOTE — FLOWSHEET NOTE
Patient resting in bed; wife at bedside. Patient and wife engage in conversation about patient's illness; state patient is feeling much better today; express gratitude for care and support of staff; express no further spiritual/emotional needs at this time; thank writer for visiting. Writer provides listening presence and supportive conversation. Spiritual Care will follow as needed. 07/08/20 1202   Encounter Summary   Services provided to: Patient and family together   Referral/Consult From: Rounding   Continue Visiting   (7/8/20)   Complexity of Encounter Moderate   Length of Encounter 15 minutes   Routine   Type Follow up   Assessment Calm; Approachable   Intervention Active listening;Explored feelings, thoughts, concerns   Outcome Comfort;Expressed gratitude;Engaged in conversation;Expressed feelings/needs/concerns

## 2020-07-09 ENCOUNTER — APPOINTMENT (OUTPATIENT)
Dept: GENERAL RADIOLOGY | Age: 70
DRG: 175 | End: 2020-07-09
Attending: FAMILY MEDICINE
Payer: MEDICARE

## 2020-07-09 LAB
ANION GAP SERPL CALCULATED.3IONS-SCNC: 14 MMOL/L (ref 9–17)
ANTI-XA UNFRAC HEPARIN: 0.34 IU/L (ref 0.3–0.7)
BNP INTERPRETATION: ABNORMAL
BUN BLDV-MCNC: 32 MG/DL (ref 8–23)
BUN/CREAT BLD: 24 (ref 9–20)
CALCIUM SERPL-MCNC: 8.8 MG/DL (ref 8.6–10.4)
CASE NUMBER:: NORMAL
CHLORIDE BLD-SCNC: 94 MMOL/L (ref 98–107)
CO2: 27 MMOL/L (ref 20–31)
CREAT SERPL-MCNC: 1.33 MG/DL (ref 0.7–1.2)
DIRECT EXAM: NORMAL
EKG ATRIAL RATE: 116 BPM
EKG P AXIS: 55 DEGREES
EKG P-R INTERVAL: 190 MS
EKG Q-T INTERVAL: 436 MS
EKG QRS DURATION: 150 MS
EKG QTC CALCULATION (BAZETT): 606 MS
EKG R AXIS: -33 DEGREES
EKG T AXIS: 80 DEGREES
EKG VENTRICULAR RATE: 116 BPM
GFR AFRICAN AMERICAN: >60 ML/MIN
GFR NON-AFRICAN AMERICAN: 53 ML/MIN
GFR SERPL CREATININE-BSD FRML MDRD: ABNORMAL ML/MIN/{1.73_M2}
GFR SERPL CREATININE-BSD FRML MDRD: ABNORMAL ML/MIN/{1.73_M2}
GLUCOSE BLD-MCNC: 178 MG/DL (ref 70–99)
HCT VFR BLD CALC: 35.2 % (ref 40.7–50.3)
HEMOGLOBIN: 11 G/DL (ref 13–17)
Lab: NORMAL
MCH RBC QN AUTO: 27.1 PG (ref 25.2–33.5)
MCHC RBC AUTO-ENTMCNC: 31.3 G/DL (ref 28.4–34.8)
MCV RBC AUTO: 86.7 FL (ref 82.6–102.9)
NRBC AUTOMATED: 0 PER 100 WBC
PDW BLD-RTO: 16.4 % (ref 11.8–14.4)
PLATELET # BLD: 244 K/UL (ref 138–453)
PMV BLD AUTO: 9.2 FL (ref 8.1–13.5)
POTASSIUM SERPL-SCNC: 4.3 MMOL/L (ref 3.7–5.3)
PRO-BNP: 3991 PG/ML
RBC # BLD: 4.06 M/UL (ref 4.21–5.77)
SODIUM BLD-SCNC: 135 MMOL/L (ref 135–144)
SPECIMEN DESCRIPTION: NORMAL
SPECIMEN DESCRIPTION: NORMAL
TROPONIN INTERP: ABNORMAL
TROPONIN T: ABNORMAL NG/ML
TROPONIN, HIGH SENSITIVITY: 443 NG/L (ref 0–22)
WBC # BLD: 14.9 K/UL (ref 3.5–11.3)

## 2020-07-09 PROCEDURE — 6360000002 HC RX W HCPCS: Performed by: NURSE PRACTITIONER

## 2020-07-09 PROCEDURE — 85520 HEPARIN ASSAY: CPT

## 2020-07-09 PROCEDURE — 6360000002 HC RX W HCPCS: Performed by: INTERNAL MEDICINE

## 2020-07-09 PROCEDURE — APPSS45 APP SPLIT SHARED TIME 31-45 MINUTES: Performed by: NURSE PRACTITIONER

## 2020-07-09 PROCEDURE — 94669 MECHANICAL CHEST WALL OSCILL: CPT

## 2020-07-09 PROCEDURE — 2580000003 HC RX 258: Performed by: NURSE PRACTITIONER

## 2020-07-09 PROCEDURE — 93010 ELECTROCARDIOGRAM REPORT: CPT | Performed by: INTERNAL MEDICINE

## 2020-07-09 PROCEDURE — 2060000000 HC ICU INTERMEDIATE R&B

## 2020-07-09 PROCEDURE — B2111ZZ FLUOROSCOPY OF MULTIPLE CORONARY ARTERIES USING LOW OSMOLAR CONTRAST: ICD-10-PCS | Performed by: INTERNAL MEDICINE

## 2020-07-09 PROCEDURE — 6370000000 HC RX 637 (ALT 250 FOR IP): Performed by: NURSE PRACTITIONER

## 2020-07-09 PROCEDURE — 2500000003 HC RX 250 WO HCPCS: Performed by: NURSE PRACTITIONER

## 2020-07-09 PROCEDURE — 6360000002 HC RX W HCPCS

## 2020-07-09 PROCEDURE — 80048 BASIC METABOLIC PNL TOTAL CA: CPT

## 2020-07-09 PROCEDURE — 2500000003 HC RX 250 WO HCPCS

## 2020-07-09 PROCEDURE — 99232 SBSQ HOSP IP/OBS MODERATE 35: CPT | Performed by: FAMILY MEDICINE

## 2020-07-09 PROCEDURE — 94660 CPAP INITIATION&MGMT: CPT

## 2020-07-09 PROCEDURE — 4A023N7 MEASUREMENT OF CARDIAC SAMPLING AND PRESSURE, LEFT HEART, PERCUTANEOUS APPROACH: ICD-10-PCS | Performed by: INTERNAL MEDICINE

## 2020-07-09 PROCEDURE — 36415 COLL VENOUS BLD VENIPUNCTURE: CPT

## 2020-07-09 PROCEDURE — 84484 ASSAY OF TROPONIN QUANT: CPT

## 2020-07-09 PROCEDURE — 6360000004 HC RX CONTRAST MEDICATION

## 2020-07-09 PROCEDURE — 2580000003 HC RX 258: Performed by: INTERNAL MEDICINE

## 2020-07-09 PROCEDURE — 93458 L HRT ARTERY/VENTRICLE ANGIO: CPT | Performed by: INTERNAL MEDICINE

## 2020-07-09 PROCEDURE — 2700000000 HC OXYGEN THERAPY PER DAY

## 2020-07-09 PROCEDURE — 83880 ASSAY OF NATRIURETIC PEPTIDE: CPT

## 2020-07-09 PROCEDURE — 6370000000 HC RX 637 (ALT 250 FOR IP): Performed by: INTERNAL MEDICINE

## 2020-07-09 PROCEDURE — 85027 COMPLETE CBC AUTOMATED: CPT

## 2020-07-09 PROCEDURE — 71045 X-RAY EXAM CHEST 1 VIEW: CPT

## 2020-07-09 PROCEDURE — B2151ZZ FLUOROSCOPY OF LEFT HEART USING LOW OSMOLAR CONTRAST: ICD-10-PCS | Performed by: INTERNAL MEDICINE

## 2020-07-09 PROCEDURE — 94761 N-INVAS EAR/PLS OXIMETRY MLT: CPT

## 2020-07-09 PROCEDURE — 94640 AIRWAY INHALATION TREATMENT: CPT

## 2020-07-09 RX ORDER — MAGNESIUM HYDROXIDE/ALUMINUM HYDROXICE/SIMETHICONE 120; 1200; 1200 MG/30ML; MG/30ML; MG/30ML
30 SUSPENSION ORAL EVERY 6 HOURS PRN
Status: DISCONTINUED | OUTPATIENT
Start: 2020-07-09 | End: 2020-07-11 | Stop reason: HOSPADM

## 2020-07-09 RX ORDER — DILTIAZEM HYDROCHLORIDE 5 MG/ML
10 INJECTION INTRAVENOUS ONCE
Status: COMPLETED | OUTPATIENT
Start: 2020-07-09 | End: 2020-07-09

## 2020-07-09 RX ORDER — PREDNISONE 20 MG/1
40 TABLET ORAL DAILY
Status: COMPLETED | OUTPATIENT
Start: 2020-07-09 | End: 2020-07-11

## 2020-07-09 RX ORDER — FUROSEMIDE 10 MG/ML
20 INJECTION INTRAMUSCULAR; INTRAVENOUS 2 TIMES DAILY
Status: DISCONTINUED | OUTPATIENT
Start: 2020-07-09 | End: 2020-07-10

## 2020-07-09 RX ORDER — DILTIAZEM HYDROCHLORIDE 60 MG/1
60 TABLET, FILM COATED ORAL EVERY 6 HOURS SCHEDULED
Status: DISCONTINUED | OUTPATIENT
Start: 2020-07-09 | End: 2020-07-10

## 2020-07-09 RX ORDER — ASPIRIN 81 MG/1
81 TABLET ORAL DAILY
COMMUNITY

## 2020-07-09 RX ORDER — PANTOPRAZOLE SODIUM 40 MG/1
40 TABLET, DELAYED RELEASE ORAL
Status: DISCONTINUED | OUTPATIENT
Start: 2020-07-10 | End: 2020-07-11 | Stop reason: HOSPADM

## 2020-07-09 RX ORDER — AZITHROMYCIN 250 MG/1
250 TABLET, FILM COATED ORAL DAILY
Status: COMPLETED | OUTPATIENT
Start: 2020-07-09 | End: 2020-07-11

## 2020-07-09 RX ADMIN — SODIUM CHLORIDE, PRESERVATIVE FREE 10 ML: 5 INJECTION INTRAVENOUS at 19:45

## 2020-07-09 RX ADMIN — AZITHROMYCIN MONOHYDRATE 250 MG: 250 TABLET ORAL at 09:54

## 2020-07-09 RX ADMIN — AZITHROMYCIN MONOHYDRATE 500 MG: 500 INJECTION, POWDER, LYOPHILIZED, FOR SOLUTION INTRAVENOUS at 03:47

## 2020-07-09 RX ADMIN — PREDNISONE 40 MG: 20 TABLET ORAL at 09:54

## 2020-07-09 RX ADMIN — HEPARIN SODIUM 16 UNITS/KG/HR: 10000 INJECTION, SOLUTION INTRAVENOUS at 19:26

## 2020-07-09 RX ADMIN — ASPIRIN 325 MG: 325 TABLET ORAL at 08:30

## 2020-07-09 RX ADMIN — ALUMINUM HYDROXIDE, MAGNESIUM HYDROXIDE, AND SIMETHICONE 30 ML: 200; 200; 20 SUSPENSION ORAL at 09:54

## 2020-07-09 RX ADMIN — Medication 3 ML: at 11:07

## 2020-07-09 RX ADMIN — METOPROLOL TARTRATE 25 MG: 25 TABLET, FILM COATED ORAL at 19:44

## 2020-07-09 RX ADMIN — DILTIAZEM HYDROCHLORIDE 60 MG: 60 TABLET, FILM COATED ORAL at 23:45

## 2020-07-09 RX ADMIN — LEVALBUTEROL 1.25 MG: 1.25 SOLUTION, CONCENTRATE RESPIRATORY (INHALATION) at 07:39

## 2020-07-09 RX ADMIN — DILTIAZEM HYDROCHLORIDE 60 MG: 60 TABLET, FILM COATED ORAL at 17:59

## 2020-07-09 RX ADMIN — LEVALBUTEROL 1.25 MG: 1.25 SOLUTION, CONCENTRATE RESPIRATORY (INHALATION) at 20:33

## 2020-07-09 RX ADMIN — METOPROLOL TARTRATE 25 MG: 25 TABLET, FILM COATED ORAL at 08:30

## 2020-07-09 RX ADMIN — FUROSEMIDE 20 MG: 10 INJECTION, SOLUTION INTRAMUSCULAR; INTRAVENOUS at 18:00

## 2020-07-09 RX ADMIN — Medication 3 ML: at 07:39

## 2020-07-09 RX ADMIN — LEVALBUTEROL 1.25 MG: 1.25 SOLUTION, CONCENTRATE RESPIRATORY (INHALATION) at 11:07

## 2020-07-09 RX ADMIN — METHYLPREDNISOLONE SODIUM SUCCINATE 40 MG: 40 INJECTION, POWDER, FOR SOLUTION INTRAMUSCULAR; INTRAVENOUS at 08:30

## 2020-07-09 RX ADMIN — ATORVASTATIN CALCIUM 80 MG: 80 TABLET, FILM COATED ORAL at 19:44

## 2020-07-09 RX ADMIN — DILTIAZEM HYDROCHLORIDE 60 MG: 60 TABLET, FILM COATED ORAL at 10:57

## 2020-07-09 RX ADMIN — CEFTRIAXONE SODIUM 1 G: 1 INJECTION, POWDER, FOR SOLUTION INTRAMUSCULAR; INTRAVENOUS at 01:43

## 2020-07-09 RX ADMIN — DILTIAZEM HYDROCHLORIDE 10 MG: 5 INJECTION, SOLUTION INTRAVENOUS at 09:54

## 2020-07-09 ASSESSMENT — ENCOUNTER SYMPTOMS
EYE DISCHARGE: 0
WHEEZING: 0
EYE ITCHING: 0
SINUS PAIN: 0
PHOTOPHOBIA: 0
ABDOMINAL DISTENTION: 0
SINUS PRESSURE: 0
COUGH: 1
NAUSEA: 0
COLOR CHANGE: 0
DIARRHEA: 0
SHORTNESS OF BREATH: 1

## 2020-07-09 NOTE — PROGRESS NOTES
(TRELEGY ELLIPTA) 100-62.5-25 MCG/INH AEPB Inhale 1 puff into the lungs daily    Historical Provider, MD young

## 2020-07-09 NOTE — PROGRESS NOTES
[facility]    Progress Note    7/9/2020    11:14 AM    Name:   Katherine Cantu  MRN:     [id number]     Acct:      [de-identified]   Room:   2031/2031-01   Day:  2  Admit Date:  7/7/2020  4:24 PM    PCP:   Angela Cueva MD  Code Status:  Full Code    Subjective:     C/C: Weakness and shortness of breath    Interval History Status: worsened. Patient condition is worsened overnight. Following his thoracentesis yesterday patient immediately had relief in his shortness of breath and he was no longer tachycardic. On my exam this morning patient has tachypneic again in the 40s. Patient is now requiring 4 L supplemental oxygen. This is a new requirement for him. Patient also required BiPAP intermittently overnight. Patient is complaining of a burning sensation in his chest that he states feels as his normal GERD. Patient states that his breathing has become much more labored over the past several hours. Serial twelve-lead's are showing interval deterioration in the inferior leads. Patient's troponin continues to trend down. Cardiology will be notified of the change of condition. There is no indication for infectious process at this point and patient will be transitioned to oral azithromycin for COPD exacerbation. Patient's white count is elevated and this is most likely secondary to high intensity steroid therapy. This will be de-escalated today. Patient remains tachycardic and in atrial fibrillation with a rate as high as 140. Patient has a beta-blocker ordered however no calcium channel blocker or bolus medications have been given. Patient will be initiated on Cardizem IV bolus and oral Cardizem in addition to the beta-blocker already ordered. This will be continued until cardiology evaluates the patient. Patient will also be maintained on the heparin drip for his known PE as well as type I versus type II non-STEMI.   We appreciate pulmonology input. We also await cardiology decision on definitive treatment on medical management versus intervention. Patient is apparently scheduled for a cardiac catheterization today at 3 PM.  We appreciate cardiology and nursing involvement in this case. Brief History:     7/7 -admitted from outside hospital for non-STEMI with known PE and large pleural effusion. Patient is a former smoker and quit 1 year ago. Patient was also found to have a likely mass identified on his chest CT. Patient is in respiratory distress and near failure on his arrival.     7/8 -patient condition is gradually improved overnight. Patient reports significantly less shortness of breath today. Troponins have plateaued and are trending down. Thoracentesis scheduled for today. We await cardiology input. 7/9 -deterioration in condition overnight. Short of breath and chest pain today. Rate controlled with CCB and heart cath scheduled this afternoon. Review of Systems:     Review of Systems   Constitutional: Positive for activity change and fatigue. Negative for chills and fever. HENT: Negative for sinus pressure, sinus pain and tinnitus. Eyes: Negative for photophobia, discharge and itching. Respiratory: Positive for cough and shortness of breath. Negative for wheezing. Cardiovascular: Positive for chest pain and palpitations. Negative for leg swelling. Gastrointestinal: Negative for abdominal distention, diarrhea and nausea. Endocrine: Negative for cold intolerance and heat intolerance. Genitourinary: Negative for enuresis, frequency and urgency. Musculoskeletal: Negative for joint swelling. Skin: Positive for pallor. Negative for color change and rash. Allergic/Immunologic: Negative for environmental allergies, food allergies and immunocompromised state. Neurological: Positive for weakness. Negative for tremors and syncope. Psychiatric/Behavioral: Negative for agitation and confusion.  The patient is not hyperactive. Medications: Allergies:  No Known Allergies    Current Meds:   Scheduled Meds:    [START ON 7/10/2020] pantoprazole  40 mg Oral QAM AC    predniSONE  40 mg Oral Daily    azithromycin  250 mg Oral Daily    dilTIAZem  60 mg Oral 4 times per day    furosemide  20 mg Intravenous BID    fluticasone-umeclidin-vilant  1 puff Inhalation Daily    sodium chloride flush  10 mL Intravenous 2 times per day    atorvastatin  80 mg Oral Nightly    metoprolol tartrate  25 mg Oral BID    aspirin  325 mg Oral Daily    levalbuterol  1.25 mg Nebulization Q4H WA     Continuous Infusions:    heparin (porcine) 16 Units/kg/hr (20 9908)     PRN Meds: aluminum & magnesium hydroxide-simethicone, docusate sodium, metoprolol, sodium chloride flush, potassium chloride **OR** potassium alternative oral replacement **OR** potassium chloride, magnesium sulfate, acetaminophen **OR** acetaminophen, magnesium hydroxide, promethazine **OR** ondansetron, perflutren lipid microspheres, heparin (porcine), heparin (porcine), sodium chloride nebulizer    Data:     Past Medical History:   has a past medical history of Afib ([facility] [address] COPD (chronic obstructive pulmonary disease) ([facility] and Influenza A. Social History:   reports that he quit smoking about 18 months ago. He has a 37.50 pack-year smoking history. He has never used smokeless tobacco. He reports previous alcohol use of about 4.0 standard drinks of alcohol per week. He reports that he does not use drugs.      Family History:   Family History   Problem Relation Age of Onset    No Known Problems Mother     Heart Disease Father     No Known Problems Brother     No Known Problems Brother        Vitals:  /84   Pulse 117   Temp 97.4 °F (36.3 °C) (Temporal)   Resp 18   Ht 5' 10\" (1.778 m)   Wt 270 lb (122.5 kg)   SpO2 91%   BMI 38.74 kg/m²   Temp (24hrs), Av.8 °F (36.6 °C), Min:97.1 °F (36.2 °C), Max:98.4 °F (36.9 °C)    No results CWXG1TMQ, R6GTPFTS, O2SAT, FIO2  Lab Results   Component Value Date/Time    SPECIAL NOT REPORTED 07/08/2020 03:30 PM    SPECIAL NOT REPORTED 07/08/2020 03:30 PM     Lab Results   Component Value Date/Time    CULTURE PENDING 07/08/2020 03:30 PM       Radiology:  Xr Chest Portable    Result Date: 7/9/2020  Similar findings, as above. Xr Chest Portable    Result Date: 7/7/2020  Large left pleural effusion with adjacent airspace disease/atelectasis. Xr Chest 1 Vw    Result Date: 7/8/2020  Interval left thoracentesis with decrease in large left pleural effusion now small to moderate. Redemonstration of bulging right aspect superior mediastinum consistent with known lymphadenopathy. Xr Chest 1 Vw    Result Date: 7/8/2020  No significant change in moderate size left pleural effusion and probable right basilar atelectasis. Unchanged appearance of fullness in the superior mediastinum, corresponding to lymphadenopathy. Ct Chest Pulmonary Embolism W Contrast    Result Date: 7/7/2020  Right distal lobar and right lower lobe segmental emboli. Bowing of the interventricular septum worrisome for heart strain unclear if this is due to the right-sided emboli versus the other additional findings within the lungs. Moderate to large left-sided effusion with left basilar opacities suggestive of areas of compressive atelectasis. Right paratracheal mass and right hilar adenopathy. Please correlate with any potential biopsy results and history (previous exam recommended biopsy). This is otherwise unchanged in size from prior exam.     Us Dup Lower Extremity Left Bryan    Result Date: 7/7/2020  No convincing evidence of DVT in the left lower extremity. Us Dup Lower Extremity Right Bryan    Result Date: 7/7/2020  No convincing evidence of DVT in the right lower extremity. Ir Guided Thoracentesis Pleural    Result Date: 7/8/2020  Successful ultrasound guided left thoracentesis.        Physical Examination:        Physical Exam  Constitutional:       Appearance: He is normal weight. He is ill-appearing. HENT:      Head: Normocephalic and atraumatic. Nose: Nose normal.      Mouth/Throat:      Mouth: Mucous membranes are dry. Eyes:      Extraocular Movements: Extraocular movements intact. Pupils: Pupils are equal, round, and reactive to light. Cardiovascular:      Rate and Rhythm: Tachycardia present. Rhythm irregular. Heart sounds: No murmur. Pulmonary:      Effort: Tachypnea and respiratory distress present. Breath sounds: Examination of the right-lower field reveals decreased breath sounds and rales. Examination of the left-lower field reveals decreased breath sounds and rales. Decreased breath sounds and rales (mild) present. Abdominal:      General: Abdomen is flat. Bowel sounds are normal.      Palpations: Abdomen is soft. Skin:     General: Skin is warm and dry. Capillary Refill: Capillary refill takes 2 to 3 seconds. Coloration: Skin is pale. Neurological:      General: No focal deficit present. Mental Status: He is alert and oriented to person, place, and time. Psychiatric:         Mood and Affect: Mood normal.         Behavior: Behavior normal.         Assessment:        Hospital Problems           Last Modified POA    * (Principal) PE (pulmonary thromboembolism) ([address] 7/8/2020 Yes    NSTEMI (non-ST elevated myocardial infarction) ([address] 7/8/2020 Yes    Chronic obstructive pulmonary disease ([address] 7/7/2020 Yes    Hypertriglyceridemia 7/7/2020 Yes    Paroxysmal atrial fibrillation ([address] 7/7/2020 Yes    Acute respiratory failure with hypoxia ([address] 7/8/2020 Yes    Pulmonary embolism during current hospitalization ([address] 7/7/2020 Yes    COPD (chronic obstructive pulmonary disease) ([address] 7/7/2020 Yes          Plan:        1. IV diuretics now  2. Continue supplemental oxygen and BiPAP as needed  3. Scheduled and as needed breathing treatments  4.  De-escalate steroid therapy and transition to oral  5. Discontinue IV antibiotics and transition to oral Zithromax for COPD exacerbation  6. IV bolus Cardizem now and initiate oral CCB  7. Patient n.p.o. now and trend troponins, notify cardiology of change in condition, possible intervention to be determined by cardiology  8. Appreciate pulmonology input  9. Protonix and Maalox for GERD. 10. Continue aspirin Lipitor for now  11. Continue beta-blocker and IV bolus beta-blocker for tachycardia provided systolic blood pressures are greater than 120  12. Continue heparin drip until definitive treatment is decided by cardiology  13. Electrolyte monitoring and correction as needed.     DONALD Joyner - NP  7/9/2020  11:14 AM

## 2020-07-09 NOTE — PROCEDURES
Cardiac cath brief note    Indications:    - NSTEMI      - Cardiomyopathy     Conclusions      Procedure Summary      Mild coronary artery disease.   Moderate LV systolic dysfunction. LVEDP 13 mm Hg.      Recommendations      Medical therapy as needed.   Risk factor modification.   Routine Post Diagnostic Cath orders.     Sara Phillips MD

## 2020-07-09 NOTE — PLAN OF CARE
Problem: Falls - Risk of:  Goal: Will remain free from falls  Description: Will remain free from falls  Outcome: Ongoing     Problem: Skin Integrity:  Goal: Absence of new skin breakdown  Description: Absence of new skin breakdown  Outcome: Ongoing     Problem: Gas Exchange - Impaired:  Goal: Levels of oxygenation will improve  Description: Levels of oxygenation will improve  Outcome: Ongoing

## 2020-07-09 NOTE — PROGRESS NOTES
cath this afternoon noted.   · Continue IV Rocephin/Zithromax for now for COPD exacerbation   · Xopenex aerosols every 4 hours   · Trelegy Ellipta 100  · Discontinue IV Solu-Medrol and start oral prednisone  · Cardiology consult, HR control  · Await fluid analysis  · DVT prophylaxis with low molecular weight heparin  · Incentive spirometry every hour while awake  · Pending fluid analysis patient will be considered PET vs EBUS as outpatient   · Discussed with RN  · Will follow with you    Electronically signed by     Alee Portillo MD on 7/9/2020 at 11:18 AM  Pulmonary Critical Care and Sleep Medicine,  [facility]  Cell: [phone]  Office: [phone]

## 2020-07-10 ENCOUNTER — APPOINTMENT (OUTPATIENT)
Dept: GENERAL RADIOLOGY | Age: 70
DRG: 175 | End: 2020-07-10
Attending: FAMILY MEDICINE
Payer: MEDICARE

## 2020-07-10 PROBLEM — Z91.89 AT RISK FOR FLUID VOLUME OVERLOAD: Status: ACTIVE | Noted: 2020-07-10

## 2020-07-10 PROBLEM — I42.8 NONISCHEMIC CARDIOMYOPATHY (HCC): Status: ACTIVE | Noted: 2020-07-10

## 2020-07-10 LAB
ABSOLUTE EOS #: 0 K/UL (ref 0–0.44)
ABSOLUTE IMMATURE GRANULOCYTE: 0.16 K/UL (ref 0–0.3)
ABSOLUTE LYMPH #: 0.32 K/UL (ref 1.1–3.7)
ABSOLUTE MONO #: 0.8 K/UL (ref 0.1–1.2)
ANION GAP SERPL CALCULATED.3IONS-SCNC: 12 MMOL/L (ref 9–17)
ANTI-XA UNFRAC HEPARIN: 0.58 IU/L (ref 0.3–0.7)
ANTI-XA UNFRAC HEPARIN: 0.8 IU/L (ref 0.3–0.7)
ANTI-XA UNFRAC HEPARIN: 0.93 IU/L (ref 0.3–0.7)
APPEARANCE FLUID: NORMAL
BASO FLUID: NORMAL %
BASOPHILS # BLD: 0 % (ref 0–2)
BASOPHILS ABSOLUTE: 0 K/UL (ref 0–0.2)
BUN BLDV-MCNC: 44 MG/DL (ref 8–23)
BUN/CREAT BLD: 33 (ref 9–20)
CALCIUM SERPL-MCNC: 8.4 MG/DL (ref 8.6–10.4)
CHLORIDE BLD-SCNC: 100 MMOL/L (ref 98–107)
CO2: 26 MMOL/L (ref 20–31)
COLOR FLUID: NORMAL
CREAT SERPL-MCNC: 1.32 MG/DL (ref 0.7–1.2)
DIFFERENTIAL TYPE: ABNORMAL
EOSINOPHIL FLUID: NORMAL %
EOSINOPHILS RELATIVE PERCENT: 0 % (ref 1–4)
FLUID DIFF COMMENT: NORMAL
GFR AFRICAN AMERICAN: >60 ML/MIN
GFR NON-AFRICAN AMERICAN: 53 ML/MIN
GFR SERPL CREATININE-BSD FRML MDRD: ABNORMAL ML/MIN/{1.73_M2}
GFR SERPL CREATININE-BSD FRML MDRD: ABNORMAL ML/MIN/{1.73_M2}
GLUCOSE BLD-MCNC: 147 MG/DL (ref 70–99)
HCT VFR BLD CALC: 36.5 % (ref 40.7–50.3)
HEMOGLOBIN: 11 G/DL (ref 13–17)
IMMATURE GRANULOCYTES: 1 %
LYMPHOCYTES # BLD: 2 % (ref 24–43)
LYMPHOCYTES, BODY FLUID: 22 %
MCH RBC QN AUTO: 26.6 PG (ref 25.2–33.5)
MCHC RBC AUTO-ENTMCNC: 30.1 G/DL (ref 28.4–34.8)
MCV RBC AUTO: 88.4 FL (ref 82.6–102.9)
MONOCYTE, FLUID: NORMAL %
MONOCYTES # BLD: 5 % (ref 3–12)
NEUTROPHIL, FLUID: 11 %
NRBC AUTOMATED: 0 PER 100 WBC
OTHER CELLS FLUID: NORMAL %
PDW BLD-RTO: 16.7 % (ref 11.8–14.4)
PLATELET # BLD: 276 K/UL (ref 138–453)
PLATELET ESTIMATE: ABNORMAL
PMV BLD AUTO: 8.8 FL (ref 8.1–13.5)
POTASSIUM SERPL-SCNC: 4.5 MMOL/L (ref 3.7–5.3)
RBC # BLD: 4.13 M/UL (ref 4.21–5.77)
RBC # BLD: ABNORMAL 10*6/UL
RBC FLUID: NORMAL /MM3
SEG NEUTROPHILS: 92 % (ref 36–65)
SEGMENTED NEUTROPHILS ABSOLUTE COUNT: 14.72 K/UL (ref 1.5–8.1)
SODIUM BLD-SCNC: 138 MMOL/L (ref 135–144)
SPECIMEN TYPE: NORMAL
SURGICAL PATHOLOGY REPORT: NORMAL
WBC # BLD: 16 K/UL (ref 3.5–11.3)
WBC # BLD: ABNORMAL 10*3/UL
WBC FLUID: 2024 /MM3

## 2020-07-10 PROCEDURE — 94640 AIRWAY INHALATION TREATMENT: CPT

## 2020-07-10 PROCEDURE — 6360000002 HC RX W HCPCS: Performed by: INTERNAL MEDICINE

## 2020-07-10 PROCEDURE — 2709999900 HC NON-CHARGEABLE SUPPLY

## 2020-07-10 PROCEDURE — 2580000003 HC RX 258: Performed by: NURSE PRACTITIONER

## 2020-07-10 PROCEDURE — 6370000000 HC RX 637 (ALT 250 FOR IP): Performed by: NURSE PRACTITIONER

## 2020-07-10 PROCEDURE — 6360000002 HC RX W HCPCS: Performed by: NURSE PRACTITIONER

## 2020-07-10 PROCEDURE — 71045 X-RAY EXAM CHEST 1 VIEW: CPT

## 2020-07-10 PROCEDURE — 80048 BASIC METABOLIC PNL TOTAL CA: CPT

## 2020-07-10 PROCEDURE — APPSS45 APP SPLIT SHARED TIME 31-45 MINUTES: Performed by: NURSE PRACTITIONER

## 2020-07-10 PROCEDURE — 36415 COLL VENOUS BLD VENIPUNCTURE: CPT

## 2020-07-10 PROCEDURE — 6370000000 HC RX 637 (ALT 250 FOR IP): Performed by: FAMILY MEDICINE

## 2020-07-10 PROCEDURE — 99232 SBSQ HOSP IP/OBS MODERATE 35: CPT | Performed by: FAMILY MEDICINE

## 2020-07-10 PROCEDURE — 2060000000 HC ICU INTERMEDIATE R&B

## 2020-07-10 PROCEDURE — C1760 CLOSURE DEV, VASC: HCPCS

## 2020-07-10 PROCEDURE — 94761 N-INVAS EAR/PLS OXIMETRY MLT: CPT

## 2020-07-10 PROCEDURE — C1725 CATH, TRANSLUMIN NON-LASER: HCPCS

## 2020-07-10 PROCEDURE — 2700000000 HC OXYGEN THERAPY PER DAY

## 2020-07-10 PROCEDURE — 85520 HEPARIN ASSAY: CPT

## 2020-07-10 PROCEDURE — 85025 COMPLETE CBC W/AUTO DIFF WBC: CPT

## 2020-07-10 PROCEDURE — C1894 INTRO/SHEATH, NON-LASER: HCPCS

## 2020-07-10 RX ORDER — DILTIAZEM HYDROCHLORIDE 60 MG/1
90 TABLET, FILM COATED ORAL EVERY 6 HOURS SCHEDULED
Status: DISCONTINUED | OUTPATIENT
Start: 2020-07-10 | End: 2020-07-11

## 2020-07-10 RX ORDER — FUROSEMIDE 20 MG/1
20 TABLET ORAL 2 TIMES DAILY
Status: DISCONTINUED | OUTPATIENT
Start: 2020-07-10 | End: 2020-07-11

## 2020-07-10 RX ADMIN — FUROSEMIDE 20 MG: 20 TABLET ORAL at 19:12

## 2020-07-10 RX ADMIN — DILTIAZEM HYDROCHLORIDE 90 MG: 60 TABLET, FILM COATED ORAL at 19:11

## 2020-07-10 RX ADMIN — LEVALBUTEROL 1.25 MG: 1.25 SOLUTION, CONCENTRATE RESPIRATORY (INHALATION) at 18:36

## 2020-07-10 RX ADMIN — METOPROLOL TARTRATE 25 MG: 25 TABLET, FILM COATED ORAL at 20:57

## 2020-07-10 RX ADMIN — SODIUM CHLORIDE, PRESERVATIVE FREE 10 ML: 5 INJECTION INTRAVENOUS at 08:42

## 2020-07-10 RX ADMIN — PANTOPRAZOLE SODIUM 40 MG: 40 TABLET, DELAYED RELEASE ORAL at 06:06

## 2020-07-10 RX ADMIN — LEVALBUTEROL 1.25 MG: 1.25 SOLUTION, CONCENTRATE RESPIRATORY (INHALATION) at 08:19

## 2020-07-10 RX ADMIN — APIXABAN 10 MG: 5 TABLET, FILM COATED ORAL at 13:19

## 2020-07-10 RX ADMIN — PREDNISONE 40 MG: 20 TABLET ORAL at 08:27

## 2020-07-10 RX ADMIN — ASPIRIN 325 MG: 325 TABLET ORAL at 08:27

## 2020-07-10 RX ADMIN — METOPROLOL TARTRATE 25 MG: 25 TABLET, FILM COATED ORAL at 08:27

## 2020-07-10 RX ADMIN — LEVALBUTEROL 1.25 MG: 1.25 SOLUTION, CONCENTRATE RESPIRATORY (INHALATION) at 15:33

## 2020-07-10 RX ADMIN — APIXABAN 10 MG: 5 TABLET, FILM COATED ORAL at 20:57

## 2020-07-10 RX ADMIN — LEVALBUTEROL 1.25 MG: 1.25 SOLUTION, CONCENTRATE RESPIRATORY (INHALATION) at 11:49

## 2020-07-10 RX ADMIN — ATORVASTATIN CALCIUM 80 MG: 80 TABLET, FILM COATED ORAL at 20:57

## 2020-07-10 RX ADMIN — DILTIAZEM HYDROCHLORIDE 60 MG: 60 TABLET, FILM COATED ORAL at 13:19

## 2020-07-10 RX ADMIN — FUROSEMIDE 20 MG: 10 INJECTION, SOLUTION INTRAMUSCULAR; INTRAVENOUS at 08:28

## 2020-07-10 RX ADMIN — HEPARIN SODIUM 15.53 UNITS/KG/HR: 10000 INJECTION, SOLUTION INTRAVENOUS at 08:33

## 2020-07-10 RX ADMIN — DILTIAZEM HYDROCHLORIDE 60 MG: 60 TABLET, FILM COATED ORAL at 06:06

## 2020-07-10 RX ADMIN — AZITHROMYCIN MONOHYDRATE 250 MG: 250 TABLET ORAL at 08:27

## 2020-07-10 RX ADMIN — SODIUM CHLORIDE, PRESERVATIVE FREE 10 ML: 5 INJECTION INTRAVENOUS at 20:58

## 2020-07-10 ASSESSMENT — ENCOUNTER SYMPTOMS
PHOTOPHOBIA: 0
ABDOMINAL DISTENTION: 0
COUGH: 0
NAUSEA: 0
SINUS PAIN: 0
EYE ITCHING: 0
SHORTNESS OF BREATH: 1
EYE DISCHARGE: 0
WHEEZING: 0
SINUS PRESSURE: 0
DIARRHEA: 0
COLOR CHANGE: 0

## 2020-07-10 ASSESSMENT — PAIN SCALES - GENERAL
PAINLEVEL_OUTOF10: 0
PAINLEVEL_OUTOF10: 0

## 2020-07-10 NOTE — PROGRESS NOTES
Pulmonary Critical Care Progress Note  Abdulaziz Tovar M.D. Patient seen for the follow up of paratracheal mass PE (pulmonary thromboembolism) ([address]     Subjective:  He had uneventful night. He is sitting up in the bed. Wore BiPAP for about 4 hours and slept fairly well last night. He denies any chest pain. He has occasional cough with white sputum. His shortness of breath is close to than his baseline. His pleural fluid cytology is still pending. Examination:  Vitals: BP (!) 121/56   Pulse 77   Temp 97.4 °F (36.3 °C) (Temporal)   Resp 18   Ht 5' 10\" (1.778 m)   Wt 270 lb (122.5 kg)   SpO2 91%   BMI 38.74 kg/m²     General appearance: alert and cooperative with exam, sitting in bed in no distress  Neck: No JVD  Lungs: Moderate air exchange, decreased on the left, no significant wheeze  Heart: regular rate and rhythm, S1, S2 normal, no gallop  Abdomen: Distended abdomen/tympanic  Extremities: no cyanosis or clubbing. No significant edema    LABs:  CBC:   Recent Labs     07/09/20  0606 07/10/20  0611   WBC 14.9* 16.0*   HGB 11.0* 11.0*   HCT 35.2* 36.5*    276     BMP:   Recent Labs     07/09/20  0843 07/10/20  0611    138   K 4.3 4.5   CO2 27 26   BUN 32* 44*   CREATININE 1.33* 1.32*   LABGLOM 53* 53*   GLUCOSE 178* 147*     PT/INR:   Recent Labs     07/07/20  1718   PROTIME 14.9*   INR 1.2     APTT:  Recent Labs     07/07/20  1718   APTT 44.5*     LIVER PROFILE:  Recent Labs     07/08/20  0452   AST 18   ALT 9   LABALBU 3.5     Radiology:  7/10/2020            Impression:  · Acute hypoxic respiratory insufficiency  · Small acute subsegmental pulmonary embolism  · Large left pleural effusion  · S/p left thoracentesis 7/8/2020 with 1.5 L removed  · 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  Fib  · Acute on chronic systolic heart failure    Recommendations:  · Oxygen by nasal cannula  · Home oxygen evaluation prior to discharge  · BiPAP for sleep and as needed  · Heparin drip for PE/elevated troponin, will start patient on oral anticoagulation once cardiology work-up is complete  · Continue oral Zithromax for COPD exacerbation   · Xopenex aerosols every 4 hours   · Trelegy Ellipta 100  · Prednisone taper   · Diuresis with IV Lasix 20 mg twice daily  · Await fluid cytology  · DVT prophylaxis with low molecular weight heparin  · Incentive spirometry every hour while awake  · Pending fluid analysis patient will be considered for PET vs EBUS as outpatient   · Discussed with RN  · Discussed with Skyler Lucero CNP  · Will follow with you    Electronically signed by     Omaira Mason MD on 7/10/2020 at 12:54 PM  Pulmonary Critical Care and Sleep Medicine,  [facility]  Cell: [phone]  Office: [phone]

## 2020-07-10 NOTE — PROGRESS NOTES
[address]    Progress Note    7/10/2020    12:57 PM    Name:   Carley Pérez  MRN:     [id number]     Acct:      [de-identified]   Room:   2031/2031-01  IP Day:  3  Admit Date:  7/7/2020  4:24 PM    PCP:   Sarah Lassiter MD  Code Status:  Full Code    Subjective:     C/C: Weakness and shortness of breath    Interval History Status: Improvement    Patient condition has stabilized and is improving today. Patient reports significantly reduced shortness of breath. Patient heart rate is well controlled. Patient underwent heart catheterization yesterday which was apparently clean. Type II MI likely secondary to pulmonary emboli. Fluid studies of thoracentesis are still pending and we are still suspicious for malignancy however this can be worked up as an outpatient. Pulmonology input is appreciated and they are comfortable with discharge at the discretion of primary. Home O2 evaluation has been requested. Provided cardiology is comfortable with patient discharge he will be discharged later today with close outpatient follow-up with pulmonology. Brief History:     7/7 -admitted from outside hospital for non-STEMI with known PE and large pleural effusion. Patient is a former smoker and quit 1 year ago. Patient was also found to have a likely mass identified on his chest CT. Patient is in respiratory distress and near failure on his arrival.     7/8 -patient condition is gradually improved overnight. Patient reports significantly less shortness of breath today. Troponins have plateaued and are trending down. Thoracentesis scheduled for today. We await cardiology input. 7/9 -deterioration in condition overnight. Short of breath and chest pain today. Rate controlled with CCB and heart cath scheduled this afternoon. 7/10 -patient condition has improved. Heart cath clean yesterday. Continues to require supplemental oxygen.   Fluid studies on thoracentesis are still pending. Transition to oral anticoagulation and anticipate discharge later today. Review of Systems:     Review of Systems   Constitutional: Positive for fatigue. Negative for activity change, chills and fever. HENT: Negative for sinus pressure, sinus pain and tinnitus. Eyes: Negative for photophobia, discharge and itching. Respiratory: Positive for shortness of breath (improved). Negative for cough and wheezing. Cardiovascular: Negative for chest pain, palpitations and leg swelling. Gastrointestinal: Negative for abdominal distention, diarrhea and nausea. Endocrine: Negative for cold intolerance and heat intolerance. Genitourinary: Negative for enuresis, frequency and urgency. Musculoskeletal: Negative for joint swelling. Skin: Negative for color change, pallor and rash. Allergic/Immunologic: Negative for environmental allergies, food allergies and immunocompromised state. Neurological: Positive for weakness. Negative for tremors and syncope. Psychiatric/Behavioral: Negative for agitation and confusion. The patient is not hyperactive. Medications:      Allergies:  No Known Allergies    Current Meds:   Scheduled Meds:    apixaban  10 mg Oral BID    pantoprazole  40 mg Oral QAM AC    predniSONE  40 mg Oral Daily    azithromycin  250 mg Oral Daily    dilTIAZem  60 mg Oral 4 times per day    furosemide  20 mg Intravenous BID    fluticasone-umeclidin-vilant  1 puff Inhalation Daily    sodium chloride flush  10 mL Intravenous 2 times per day    atorvastatin  80 mg Oral Nightly    metoprolol tartrate  25 mg Oral BID    aspirin  325 mg Oral Daily    levalbuterol  1.25 mg Nebulization Q4H WA     Continuous Infusions:     PRN Meds: aluminum & magnesium hydroxide-simethicone, docusate sodium, metoprolol, sodium chloride flush, potassium chloride **OR** potassium alternative oral replacement **OR** potassium chloride, magnesium sulfate, acetaminophen --    ANIONGAP  --   --  13 14 12   LABGLOM  --   --  >60 53* 53*   GFRAA  --   --  >60 >60 >60   CALCIUM  --   --  8.6 8.8 8.4*   PROBNP  --   --   --  3,991*  --    TROPHS 740* 650*  --  443*  --      Recent Labs     07/08/20  0452   PROT 6.6   LABALBU 3.5   AST 18   ALT 9   ALKPHOS 52   BILITOT 1.01   CHOL 119   HDL 44   LDLCHOLESTEROL 60   CHOLHDLRATIO 2.7   TRIG 73   VLDL NOT REPORTED     ABG:No results found for: POCPH, PHART, PH, POCPCO2, KXL2YGQ, PCO2, POCPO2, PO2ART, PO2, POCHCO3, IOC3HUB, HCO3, NBEA, PBEA, BEART, BE, THGBART, THB, ETQ2XYC, FIKM9OML, O4YYZZNU, O2SAT, FIO2  Lab Results   Component Value Date/Time    SPECIAL NOT REPORTED 07/08/2020 03:30 PM    SPECIAL NOT REPORTED 07/08/2020 03:30 PM     Lab Results   Component Value Date/Time    CULTURE PENDING 07/08/2020 03:30 PM       Radiology:  Xr Chest Portable    Result Date: 7/9/2020  Similar findings, as above. Xr Chest Portable    Result Date: 7/7/2020  Large left pleural effusion with adjacent airspace disease/atelectasis. Xr Chest 1 Vw    Result Date: 7/8/2020  Interval left thoracentesis with decrease in large left pleural effusion now small to moderate. Redemonstration of bulging right aspect superior mediastinum consistent with known lymphadenopathy. Xr Chest 1 Vw    Result Date: 7/8/2020  No significant change in moderate size left pleural effusion and probable right basilar atelectasis. Unchanged appearance of fullness in the superior mediastinum, corresponding to lymphadenopathy. Ct Chest Pulmonary Embolism W Contrast    Result Date: 7/7/2020  Right distal lobar and right lower lobe segmental emboli. Bowing of the interventricular septum worrisome for heart strain unclear if this is due to the right-sided emboli versus the other additional findings within the lungs. Moderate to large left-sided effusion with left basilar opacities suggestive of areas of compressive atelectasis.  Right paratracheal mass and right hilar adenopathy. Please correlate with any potential biopsy results and history (previous exam recommended biopsy). This is otherwise unchanged in size from prior exam.     Us Dup Lower Extremity Left Bryan    Result Date: 7/7/2020  No convincing evidence of DVT in the left lower extremity. Us Dup Lower Extremity Right Bryan    Result Date: 7/7/2020  No convincing evidence of DVT in the right lower extremity. Ir Guided Thoracentesis Pleural    Result Date: 7/8/2020  Successful ultrasound guided left thoracentesis. Physical Examination:        Physical Exam  Constitutional:       Appearance: He is normal weight. He is ill-appearing. HENT:      Head: Normocephalic and atraumatic. Nose: Nose normal.      Mouth/Throat:      Mouth: Mucous membranes are dry. Eyes:      Extraocular Movements: Extraocular movements intact. Pupils: Pupils are equal, round, and reactive to light. Cardiovascular:      Rate and Rhythm: Tachycardia present. Rhythm irregular. Heart sounds: No murmur. Pulmonary:      Effort: Tachypnea and respiratory distress (improved) present. Breath sounds: Examination of the right-lower field reveals decreased breath sounds. Examination of the left-lower field reveals decreased breath sounds. Decreased breath sounds present. No rales (mild). Abdominal:      General: Abdomen is flat. Bowel sounds are normal.      Palpations: Abdomen is soft. Skin:     General: Skin is warm and dry. Capillary Refill: Capillary refill takes 2 to 3 seconds. Coloration: Skin is not pale. Neurological:      General: No focal deficit present. Mental Status: He is alert and oriented to person, place, and time.    Psychiatric:         Mood and Affect: Mood normal.         Behavior: Behavior normal.         Assessment:        Hospital Problems           Last Modified POA    * (Principal) PE (pulmonary thromboembolism) ([facility] [address] 7/8/2020 Yes    NSTEMI (non-ST elevated myocardial infarction) ([facility] 7/8/2020 Yes    [facility] 7/7/2020 Yes    Hypertriglyceridemia 7/7/2020 Yes    Paroxysmal atrial fibrillation ([facility] 7/7/2020 Yes    Acute respiratory failure with hypoxia ([facility] 7/8/2020 Yes    Pulmonary embolism during current hospitalization ([facility] 7/7/2020 Yes    COPD (chronic obstructive pulmonary disease) ([facility] 7/7/2020 Yes    At risk for fluid volume overload 7/10/2020 Yes          Plan:        1. IV diuretics, transition oral  2. Continue supplemental oxygen and BiPAP as needed  3. Scheduled and as needed breathing treatments  4. De-escalate steroid therapy and transition to oral  5. Discontinue IV antibiotics and transition to oral Zithromax for COPD exacerbation  6. Transition calcium channel blocker to oral  7. Appreciate pulmonology input  8. Protonix and Maalox for GERD. 9. Continue aspirin Lipitor  10. Continue beta-blocker and IV bolus beta-blocker for tachycardia provided systolic blood pressures are greater than 120  11. Transition to oral anti-coagulation  12. Electrolyte monitoring and correction as needed.     Nadia Lundborg, APRN - NP  7/10/2020  12:57 PM

## 2020-07-10 NOTE — PLAN OF CARE
Home Oxygen Evaluation    Home Oxygen Evaluation completed. Patient is on 3 liters per minute via NC at rest.  Resting SpO2 = 93% at rest  Resting SpO2 on room air = 87% at rest.     SpO2 on room air with exercise = NA  SpO2 on oxygen as above with exercise = NA    Nocturnal Oximetry with patient on room air is recommended is SpO2 is between 89% and 95% (requires additional order).     Trace 108  3:30 PM

## 2020-07-10 NOTE — CARE COORDINATION
Discharge planning    Patient chart reviewed. Appreciate prior SS notes. Per SS patient lives with wife and family and uses not DME and independent prior to admit. Patient admitted with PE and currently on 02 at 3.5 liters. . patient has copd and will qualify for 02 if needed. He is s/p thoracentesis on 7/8 for 1.5 liters. Patient is on heparin gtt and anticipate likely need for anticoagulation and has [employer]. , may require a PA . 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 Plan for cardiac cath this afternoon noted.   · Continue IV Rocephin/Zithromax for now for COPD exacerbation

## 2020-07-10 NOTE — PROGRESS NOTES
[facility]  Progress Note                   Date:   7/10/2020  Patient name: Nonda Bence  Date of admission:  7/7/2020  4:24 PM  MRN:   [id number]  YOB: 1950  PCP: Faby Cline MD    Reason for Admission: Pulmonary embolism during current hospitalization ([facility] [address] [I26.99]    Subjective:       Clinical Changes /Abnormalities: Seen & examined in room after discussing case with RN. No post cath site complications/concerns. Tele remains Aflutter - rate controlled with rest 70-80s but with any activity HR increases 90-120s. Review of Systems    Medications:   Scheduled Meds:   apixaban  10 mg Oral BID    furosemide  20 mg Oral BID    pantoprazole  40 mg Oral QAM AC    predniSONE  40 mg Oral Daily    azithromycin  250 mg Oral Daily    dilTIAZem  60 mg Oral 4 times per day    fluticasone-umeclidin-vilant  1 puff Inhalation Daily    sodium chloride flush  10 mL Intravenous 2 times per day    atorvastatin  80 mg Oral Nightly    metoprolol tartrate  25 mg Oral BID    aspirin  325 mg Oral Daily    levalbuterol  1.25 mg Nebulization Q4H WA     Continuous Infusions:  CBC:   Recent Labs     07/08/20  0452 07/09/20  0606 07/10/20  0611   WBC 7.3 14.9* 16.0*   HGB 11.6* 11.0* 11.0*    244 276     BMP:    Recent Labs     07/08/20  0452 07/09/20  0843 07/10/20  0611   * 135 138   K 4.5 4.3 4.5   CL 95* 94* 100   CO2 24 27 26   BUN 17 32* 44*   CREATININE 1.14 1.33* 1.32*   GLUCOSE 192* 178* 147*     Hepatic:  Recent Labs     07/08/20  0452   AST 18   ALT 9   BILITOT 1.01   ALKPHOS 52     Troponin:   Recent Labs     07/07/20  1718 07/07/20  2030 07/09/20  0843   TROPHS 740* 650* 443*     BNP: No results for input(s): BNP in the last 72 hours.   Lipids:   Recent Labs     07/08/20  0452   CHOL 119   HDL 44     INR:   Recent Labs     07/07/20  1718   INR 1.2       Objective:   Vitals: /65   Pulse 77   Temp 97.3 °F (36.3 °C) (Temporal)   Resp 18   Ht 5' 10\" (1.778 m)   Wt 270 lb (122.5 kg)   SpO2 91%   BMI 38.74 kg/m²   General appearance: alert and cooperative with exam  HEENT: Head: Normocephalic, no lesions, without obvious abnormality. Neck:no JVD, trachea midline, no adenopathy  Lungs: Clear to auscultation b/l upper lobes posteriorly, dim b/l bases posteriorly, fine expiratory wheezing anteriorly. On NC oxygen without distress  Heart: Irregular rate and rhythm, s1/s2 auscultated, no murmurs. Aflultter 3:1/2:1  Abdomen: soft, non-tender, bowel sounds active, Obese  Extremities: no edema  Neurologic: not done    Cardiac Cath 7/9/2020  Cardiac cath brief note     Indications:    - NSTEMI      - Cardiomyopathy     Conclusions      Procedure Summary      Mild coronary artery disease.   Moderate LV systolic dysfunction. LVEDP 13 mm Hg.      Recommendations      Medical therapy as needed.   Risk factor modification.   Routine Post Diagnostic Cath orders. Echo 7/7/20  Summary  Mildly dilated left ventricle with moderately reduced function of 35-40%. Left atrium is mildly dilated. Aortic valve not well visualized but appears normal in structure and  function without stenosis or regurgitation. large pleural effusion noted    Assessment / Acute Cardiac Problems:   1. Acute PE  2. Atrial flutter  3. Acute hypoxic resp failure  4. Right paratracheal mass w/ hilar lymphadenopathy  5. Acute COPD exacerbation  6. NICMP with LVEF 35-40%  7. Non-obstructive CAD    Patient Active Problem List:     NSTEMI (non-ST elevated myocardial infarction) ([address]     PE (pulmonary thromboembolism) (HCC)     Atrial flutter (HCC)     Chronic obstructive pulmonary disease (HCC)     Hypertriglyceridemia     Hypoxia     Paroxysmal atrial fibrillation (HCC)     Prediabetes     Acute respiratory failure with hypoxia (HCC)     Sciatica     Obesity (BMI 30-39. 9)     Pulmonary embolism during current hospitalization ([address]     COPD (chronic obstructive pulmonary disease) (HCC)     At risk for fluid volume overload      Plan of Treatment:   1. Remains Aflutter with rate uncontrolled with activity. Will increase PO Cardizem. Continue PO BB. Eliquis started and dose adjusted post 10day PE protocol. Discussed in detail with patient need for rate control prior to discharge and if unsuccessful may require WYATT/CV as inpatient vs CV as outpatient. Questions/concerns addressed. 2. NICMP - stable. No significant volume overload presently. Continue PO BB. No ACE d/t hyptension. PO Lasix BID. 3. Min CAD- stable.  Continue PO ASA, statin, BB.   4. Will continue to follow    Electronically signed by DONALD Langston CNP on 7/10/2020 at [address] [facility]. [phone]

## 2020-07-11 ENCOUNTER — APPOINTMENT (OUTPATIENT)
Dept: GENERAL RADIOLOGY | Age: 70
DRG: 175 | End: 2020-07-11
Attending: FAMILY MEDICINE
Payer: MEDICARE

## 2020-07-11 VITALS
RESPIRATION RATE: 18 BRPM | TEMPERATURE: 97.3 F | BODY MASS INDEX: 38.65 KG/M2 | SYSTOLIC BLOOD PRESSURE: 124 MMHG | HEART RATE: 80 BPM | HEIGHT: 70 IN | OXYGEN SATURATION: 95 % | WEIGHT: 270 LBS | DIASTOLIC BLOOD PRESSURE: 95 MMHG

## 2020-07-11 LAB
ANION GAP SERPL CALCULATED.3IONS-SCNC: 12 MMOL/L (ref 9–17)
BUN BLDV-MCNC: 38 MG/DL (ref 8–23)
BUN/CREAT BLD: 37 (ref 9–20)
CALCIUM SERPL-MCNC: 8.4 MG/DL (ref 8.6–10.4)
CHLORIDE BLD-SCNC: 101 MMOL/L (ref 98–107)
CO2: 28 MMOL/L (ref 20–31)
CREAT SERPL-MCNC: 1.04 MG/DL (ref 0.7–1.2)
GFR AFRICAN AMERICAN: >60 ML/MIN
GFR NON-AFRICAN AMERICAN: >60 ML/MIN
GFR SERPL CREATININE-BSD FRML MDRD: ABNORMAL ML/MIN/{1.73_M2}
GFR SERPL CREATININE-BSD FRML MDRD: ABNORMAL ML/MIN/{1.73_M2}
GLUCOSE BLD-MCNC: 119 MG/DL (ref 70–99)
HCT VFR BLD CALC: 38.6 % (ref 40.7–50.3)
HEMOGLOBIN: 11.5 G/DL (ref 13–17)
MCH RBC QN AUTO: 26.3 PG (ref 25.2–33.5)
MCHC RBC AUTO-ENTMCNC: 29.8 G/DL (ref 28.4–34.8)
MCV RBC AUTO: 88.3 FL (ref 82.6–102.9)
NRBC AUTOMATED: 0 PER 100 WBC
PDW BLD-RTO: 16.8 % (ref 11.8–14.4)
PLATELET # BLD: 319 K/UL (ref 138–453)
PMV BLD AUTO: 9 FL (ref 8.1–13.5)
POTASSIUM SERPL-SCNC: 3.8 MMOL/L (ref 3.7–5.3)
RBC # BLD: 4.37 M/UL (ref 4.21–5.77)
SODIUM BLD-SCNC: 141 MMOL/L (ref 135–144)
WBC # BLD: 13.5 K/UL (ref 3.5–11.3)

## 2020-07-11 PROCEDURE — 6360000002 HC RX W HCPCS: Performed by: INTERNAL MEDICINE

## 2020-07-11 PROCEDURE — 6370000000 HC RX 637 (ALT 250 FOR IP): Performed by: FAMILY MEDICINE

## 2020-07-11 PROCEDURE — 71045 X-RAY EXAM CHEST 1 VIEW: CPT

## 2020-07-11 PROCEDURE — 80048 BASIC METABOLIC PNL TOTAL CA: CPT

## 2020-07-11 PROCEDURE — 6370000000 HC RX 637 (ALT 250 FOR IP): Performed by: INTERNAL MEDICINE

## 2020-07-11 PROCEDURE — 36415 COLL VENOUS BLD VENIPUNCTURE: CPT

## 2020-07-11 PROCEDURE — 6370000000 HC RX 637 (ALT 250 FOR IP): Performed by: NURSE PRACTITIONER

## 2020-07-11 PROCEDURE — 85027 COMPLETE CBC AUTOMATED: CPT

## 2020-07-11 PROCEDURE — 2700000000 HC OXYGEN THERAPY PER DAY

## 2020-07-11 PROCEDURE — 94660 CPAP INITIATION&MGMT: CPT

## 2020-07-11 PROCEDURE — 99239 HOSP IP/OBS DSCHRG MGMT >30: CPT | Performed by: FAMILY MEDICINE

## 2020-07-11 PROCEDURE — 94640 AIRWAY INHALATION TREATMENT: CPT

## 2020-07-11 PROCEDURE — 2580000003 HC RX 258: Performed by: NURSE PRACTITIONER

## 2020-07-11 PROCEDURE — 94761 N-INVAS EAR/PLS OXIMETRY MLT: CPT

## 2020-07-11 PROCEDURE — 99223 1ST HOSP IP/OBS HIGH 75: CPT | Performed by: INTERNAL MEDICINE

## 2020-07-11 RX ORDER — CALCIUM CARBONATE 200(500)MG
500 TABLET,CHEWABLE ORAL 3 TIMES DAILY PRN
Status: DISCONTINUED | OUTPATIENT
Start: 2020-07-11 | End: 2020-07-11 | Stop reason: HOSPADM

## 2020-07-11 RX ORDER — ASPIRIN 81 MG/1
81 TABLET ORAL DAILY
Status: DISCONTINUED | OUTPATIENT
Start: 2020-07-11 | End: 2020-07-11 | Stop reason: HOSPADM

## 2020-07-11 RX ORDER — DILTIAZEM HYDROCHLORIDE 360 MG/1
360 CAPSULE, EXTENDED RELEASE ORAL DAILY
Qty: 30 CAPSULE | Refills: 3 | Status: SHIPPED | OUTPATIENT
Start: 2020-07-11

## 2020-07-11 RX ORDER — DILTIAZEM HYDROCHLORIDE 180 MG/1
360 CAPSULE, COATED, EXTENDED RELEASE ORAL DAILY
Status: DISCONTINUED | OUTPATIENT
Start: 2020-07-11 | End: 2020-07-11 | Stop reason: HOSPADM

## 2020-07-11 RX ORDER — PANTOPRAZOLE SODIUM 40 MG/1
40 TABLET, DELAYED RELEASE ORAL
Qty: 30 TABLET | Refills: 3 | Status: SHIPPED | OUTPATIENT
Start: 2020-07-12

## 2020-07-11 RX ADMIN — PREDNISONE 40 MG: 20 TABLET ORAL at 08:16

## 2020-07-11 RX ADMIN — PANTOPRAZOLE SODIUM 40 MG: 40 TABLET, DELAYED RELEASE ORAL at 05:08

## 2020-07-11 RX ADMIN — SODIUM CHLORIDE, PRESERVATIVE FREE 10 ML: 5 INJECTION INTRAVENOUS at 08:21

## 2020-07-11 RX ADMIN — DILTIAZEM HYDROCHLORIDE 360 MG: 180 CAPSULE, COATED, EXTENDED RELEASE ORAL at 13:48

## 2020-07-11 RX ADMIN — ALUMINUM HYDROXIDE, MAGNESIUM HYDROXIDE, AND SIMETHICONE 30 ML: 200; 200; 20 SUSPENSION ORAL at 06:05

## 2020-07-11 RX ADMIN — DILTIAZEM HYDROCHLORIDE 90 MG: 60 TABLET, FILM COATED ORAL at 05:08

## 2020-07-11 RX ADMIN — METOPROLOL TARTRATE 25 MG: 25 TABLET, FILM COATED ORAL at 08:20

## 2020-07-11 RX ADMIN — LEVALBUTEROL 1.25 MG: 1.25 SOLUTION, CONCENTRATE RESPIRATORY (INHALATION) at 12:07

## 2020-07-11 RX ADMIN — ANTACID TABLETS 500 MG: 500 TABLET, CHEWABLE ORAL at 05:07

## 2020-07-11 RX ADMIN — APIXABAN 10 MG: 5 TABLET, FILM COATED ORAL at 08:16

## 2020-07-11 RX ADMIN — AZITHROMYCIN MONOHYDRATE 250 MG: 250 TABLET ORAL at 08:20

## 2020-07-11 RX ADMIN — LEVALBUTEROL 1.25 MG: 1.25 SOLUTION, CONCENTRATE RESPIRATORY (INHALATION) at 15:36

## 2020-07-11 RX ADMIN — ASPIRIN 81 MG: 81 TABLET, COATED ORAL at 08:20

## 2020-07-11 ASSESSMENT — PAIN SCALES - GENERAL
PAINLEVEL_OUTOF10: 0

## 2020-07-11 NOTE — PROGRESS NOTES
PULMONARY PROGRESS NOTE:    Interval History: paratracheal mass PE     Shortness of Breath: no  Cough: no  Sputum: no  Hemoptysis: no  Chest Pain: no  Fever: no  Swelling Feet: no  Headache: no  Nausea, Emesis, Abdominal Pain: no  Diarrhea: no  Constipation: no    Events since last visit: seen by oncology this morning - to f/u with them    PAST MEDICAL HISTORY:    Smoking:     PHYSICAL EXAMINATION:  afebrile  General : Awake, alert, oriented to time, place, and person  Neck - supple, no lymphadenopathy, JVD not raised  Heart - regular rhythm, S1 and S2 normal; no additional sounds heard  Lungs - Air Entry- fair bilaterally; breath sounds : vesicular 90% on 3 l nc  Abdomen - soft, no tenderness  Upper Extremities  - no cyanosis, mottling; edema : absent  Lower Extremities: no cyanosis, mottling; edema : absent    Current Laboratory, Radiologic, Microbiologic, and Diagnostic studies reviewed    ASSESSMENT / PLAN:  Impression:  · Acute hypoxic respiratory insufficiency  · Small acute subsegmental pulmonary embolism  · Large left pleural effusion  § S/p left thoracentesis 7/8/2020 with 1.5 L removed  § MB no growth to date  § Path - metastatic lung adenocarcinoma  · 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  Fib  · Acute on chronic systolic heart failure     Recommendations:  · Oxygen by nasal cannula  · Home oxygen evaluation prior to discharge  · BiPAP for sleep and as needed  · Heparin drip for PE/elevated troponin, will start patient on oral anticoagulation once cardiology work-up is complete  · Continue oral Zithromax for COPD exacerbation   · Xopenex aerosols every 4 hours   · Trelegy Ellipta 100  · Prednisone taper   · Diuresis with IV Lasix 20 mg twice daily  · Await fluid cytology  · DVT prophylaxis with low molecular weight heparin  · Incentive spirometry every hour while awake  · Ok to go and follow up with oncology - can get rest of work up as OP - MRI brain and CT abd / pelvis  · Follow up with Dr Beto Borden of care discussed with Dr Ruth Lynch, APRN - CNP 7-11-20 [id number]    REASON FOR VISIT: Pl effusion, PE    I examined the patient myself. I have reviewed HPI, ROS, current medications, labs and pertinent radiographic studies with patient and NP. The assessment and Plan in the note per my discussion with NP.       Electronically signed by Cindy Kay on 07/11/20 at 8:27 PM.

## 2020-07-11 NOTE — CONSULTS
Today's Date: 7/11/2020  Patient Name: Marianela Ma  Date of admission: 7/7/2020  4:24 PM  Patient's age: 68 y.o., 1950  Admission Dx: Pulmonary embolism during current hospitalization ([facility] [address] [I26.99]    Reason for Consult: management recommendations  Requesting Physician: Tim Amaya MD    CHIEF COMPLAINT: Shortness of breath. Adenocarcinoma of lung. History Obtained From:  patient, electronic medical record    HISTORY OF PRESENT ILLNESS:      The patient is a 68 y.o.  male who is admitted to the hospital on 7/7/2020 with chief complaints of shortness of breath. Work-up revealed large pleural effusion pulmonary embolism and non-ST elevation MI. Patient also has known pulmonary embolism. CT chest also identified thoracic mass. Patient underwent thoracentesis on 7/8. Also underwent heart catheterization on 7/9 which showed no critical stenosis of the coronaries. Ultrasound of lower extremities did not show any DVT. CT chest showed right paratracheal mass and hilar lymphadenopathy. Patient thoracentesis fluid cytology came back positive for adenocarcinoma CK 7+ TTF-1 1+ and P 40- suggestive of adenocarcinoma of lung primary. Past Medical History:   has a past medical history of Afib ([facility] [address] COPD (chronic obstructive pulmonary disease) ([facility] [address] and Influenza A. Past Surgical History: No abdominal surgery in the past    Medications:    Prior to Admission medications    Medication Sig Start Date End Date Taking?  Authorizing Provider   aspirin 81 MG EC tablet Take 81 mg by mouth daily   Yes Historical Provider, MD   Multiple Vitamin (MULTI VITAMIN DAILY PO) Take 1 tablet by mouth daily   Yes Historical Provider, MD   fluticasone-umeclidin-vilant (TRELEGY ELLIPTA) 100-62.5-25 MCG/INH AEPB Inhale 1 puff into the lungs daily    Historical Provider, MD     Current Facility-Administered Medications   Medication Dose Route Frequency Provider Last Rate Last Dose    calcium carbonate (TUMS) chewable tablet 500 mg  500 mg Oral TID PRN Mally Kimbrough MD   500 mg at 07/11/20 0507    aspirin EC tablet 81 mg  81 mg Oral Daily Uriel Brooks MD   81 mg at 07/11/20 0820    dilTIAZem (CARDIZEM CD) extended release capsule 360 mg  360 mg Oral Daily Uriel Brooks MD        apixaban [facility]) tablet 10 mg  10 mg Oral BID Elio Pelayo MD   10 mg at 07/11/20 0816    [START ON 7/17/2020] apixaban (ELIQUIS) tablet 5 mg  5 mg Oral BID DONALD Briseno CNP        pantoprazole (PROTONIX) tablet 40 mg  40 mg Oral QAM AC DONALD Pablo NP   40 mg at 07/11/20 0508    aluminum & magnesium hydroxide-simethicone (MAALOX) 200-200-20 MG/5ML suspension 30 mL  30 mL Oral Q6H PRN DONALD Pablo NP   30 mL at 07/11/20 4431    docusate sodium (COLACE) capsule 100 mg  100 mg Oral BID PRN DONALD Celeste CNP        metoprolol (LOPRESSOR) injection 5 mg  5 mg Intravenous Q4H PRN DONALD Briseno CNP   5 mg at 07/08/20 1606    fluticasone-umeclidin-vilant (TRELEGY ELLIPTA) 100-62.5-25 MCG/INH inhaler 1 puff  1 puff Inhalation Daily DONALD Pablo NP   1 puff at 07/09/20 0742    sodium chloride flush 0.9 % injection 10 mL  10 mL Intravenous 2 times per day DONALD Pablo NP   10 mL at 07/11/20 4879    sodium chloride flush 0.9 % injection 10 mL  10 mL Intravenous PRN DONALD Pablo NP        potassium chloride (KLOR-CON M) extended release tablet 40 mEq  40 mEq Oral PRN DONALD Pablo NP        Or   [facility] potassium bicarb-citric acid (EFFER-K) effervescent tablet 40 mEq  40 mEq Oral PRN DONALD Pablo NP        Or   [facility] potassium chloride 10 mEq/100 mL IVPB (Peripheral Line)  10 mEq Intravenous PRN Venkatesh Seed, APRN - NP        magnesium sulfate 1 g in dextrose 5% 100 mL IVPB  1 g Intravenous PRN DONALD Pablo NP        acetaminophen (TYLENOL) tablet 650 mg  650 mg Oral Q6H PRN DONALD Pablo NP        Or   [facility] acetaminophen (TYLENOL) suppository 650 mg  650 mg Rectal Q6H PRN DONALD Diego - NP        magnesium hydroxide (MILK OF MAGNESIA) 400 MG/5ML suspension 30 mL  30 mL Oral Daily PRN DONALD Diego - NAE        promethazine (PHENERGAN) tablet 12.5 mg  12.5 mg Oral Q6H PRN DONALD Diego - NAE        Or    [facility]dansetron [facility]) injection 4 mg  4 mg Intravenous Q6H PRN DONALD Diego - NP        atorvastatin (LIPITOR) tablet 80 mg  80 mg Oral Nightly DONALD Diego - NP   80 mg at 07/10/20 2057    metoprolol tartrate (LOPRESSOR) tablet 25 mg  25 mg Oral BID DONALD Diego - NP   25 mg at 07/11/20 0820    perflutren lipid microspheres (DEFINITY) injection 1.65 mg  1.5 mL Intravenous ONCE PRN DONALD Diego NP        levalbuterol [facility]) nebulizer solution 1.25 mg  1.25 mg Nebulization Q4H WA Devon Velez MD   1.25 mg at 07/10/20 1836    sodium chloride nebulizer 0.9 % solution 3 mL  3 mL Nebulization Q8H PRN Devon Velez MD   3 mL at 07/09/20 1107       Allergies:  Patient has no known allergies. Social History:   reports that he quit smoking about 18 months ago. He has a 37.50 pack-year smoking history. He has never used smokeless tobacco. He reports previous alcohol use of about 4.0 standard drinks of alcohol per week. He reports that he does not use drugs. Family History: family history includes Heart Disease in his father; No Known Problems in his brother, brother, and mother. REVIEW OF SYSTEMS:      Constitutional: No fever or chills. No night sweats, no weight loss   Eyes: No eye discharge, double vision, or eye pain   HEENT: negative for sore mouth, sore throat, hoarseness and voice change. Positive cough  Respiratory: Positive shortness of breath.   Cardiovascular: negative for chest pain, dyspnea, palpitations, orthopnea, PND   Gastrointestinal: negative for nausea, vomiting, diarrhea, constipation, abdominal pain, Dysphagia, hematemesis and hematochezia   Genitourinary: negative for frequency, dysuria, nocturia, urinary incontinence, and hematuria   Integument: negative for rash, skin lesions, bruises. Hematologic/Lymphatic: negative for easy bruising, bleeding, lymphadenopathy, or petechiae   Endocrine: negative for heat or cold intolerance,weight changes, change in bowel habits and hair loss   Musculoskeletal: negative for myalgias, arthralgias, pain, joint swelling,and bone pain   Neurological: negative for headaches, dizziness, seizures, weakness, numbness    PHYSICAL EXAM:        /78   Pulse 89   Temp 97.3 °F (36.3 °C) (Temporal)   Resp 18   Ht 5' 10\" (1.778 m)   Wt 270 lb (122.5 kg)   SpO2 90%   BMI 38.74 kg/m²    Temp (24hrs), Av.2 °F (36.2 °C), Min:96.7 °F (35.9 °C), Max:97.8 °F (36.6 °C)      General appearance - well appearing, no in pain or distress   Mental status - alert and cooperative   Eyes - pupils equal and reactive, extraocular eye movements intact   Ears - bilateral TM's and external ear canals normal   Mouth - mucous membranes moist, pharynx normal without lesions   Neck - supple, no significant adenopathy   Lymphatics - no palpable lymphadenopathy, no hepatosplenomegaly   Chest -decreased breathing sounds bilaterally  Heart - normal rate, regular rhythm, normal S1, S2, no murmurs  Abdomen - soft, nontender, nondistended, no masses or organomegaly   Neurological - alert, oriented, normal speech, no focal findings or movement disorder noted   Musculoskeletal - no joint tenderness, deformity or swelling   Extremities - peripheral pulses normal, no pedal edema, no clubbing or cyanosis   Skin - normal coloration and turgor, no rashes, no suspicious skin lesions noted ,      DATA:      Labs:     Results for orders placed or performed during the hospital encounter of 20   Cult,Fluid    Specimen: Thoracentesis; Body Fluid   Result Value Ref Range    Specimen Description . THORACENTESIS FLUID L     Special Requests NOT REPORTED     Direct Exam NO NEUTROPHILS SEEN     Direct Exam NO BACTERIA SEEN     Culture NO GROWTH 3 DAYS    Fungal stain    Specimen: Pleural Fluid   Result Value Ref Range    Specimen Description . PLEURAL FLUID L     Special Requests NOT REPORTED     Direct Exam NO FUNGAL ELEMENTS SEEN    Culture with Smear, Acid Fast Bacillius    Specimen: Thoracentesis   Result Value Ref Range    Specimen Description . THORACENTESIS FLUID L     Special Requests NOT REPORTED     Direct Exam NO ACID FAST BACILLI SEEN (CONCENTRATED SMEAR)     Culture PENDING    Troponin   Result Value Ref Range    Troponin, High Sensitivity 740 (HH) 0 - 22 ng/L    Troponin T NOT REPORTED <0.03 ng/mL    Troponin Interp NOT REPORTED    Troponin   Result Value Ref Range    Troponin, High Sensitivity 650 (HH) 0 - 22 ng/L    Troponin T NOT REPORTED <0.03 ng/mL    Troponin Interp NOT REPORTED    CBC   Result Value Ref Range    WBC 9.7 3.5 - 11.3 k/uL    RBC 4.41 4.21 - 5.77 m/uL    Hemoglobin 11.7 (L) 13.0 - 17.0 g/dL    Hematocrit 38.1 (L) 40.7 - 50.3 %    MCV 86.4 82.6 - 102.9 fL    MCH 26.5 25.2 - 33.5 pg    MCHC 30.7 28.4 - 34.8 g/dL    RDW 16.2 (H) 11.8 - 14.4 %    Platelets 981 792 - 732 k/uL    MPV 9.0 8.1 - 13.5 fL    NRBC Automated 0.0 0.0 per 100 WBC   APTT   Result Value Ref Range    PTT 44.5 (H) 23.9 - 33.8 sec   Protime-INR   Result Value Ref Range    Protime 14.9 (H) 11.5 - 14.2 sec    INR 1.2    Comprehensive Metabolic Panel w/ Reflex to MG   Result Value Ref Range    Glucose 192 (H) 70 - 99 mg/dL    BUN 17 8 - 23 mg/dL    CREATININE 1.14 0.70 - 1.20 mg/dL    Bun/Cre Ratio 15 9 - 20    Calcium 8.6 8.6 - 10.4 mg/dL    Sodium 132 (L) 135 - 144 mmol/L    Potassium 4.5 3.7 - 5.3 mmol/L    Chloride 95 (L) 98 - 107 mmol/L    CO2 24 20 - 31 mmol/L    Anion Gap 13 9 - 17 mmol/L    Alkaline Phosphatase 52 40 - 129 U/L    ALT 9 5 - 41 U/L    AST 18 <40 U/L    Total Bilirubin 1.01 0.3 - 1.2 mg/dL    Total Protein 6.6 6.4 - 8.3 g/dL    Alb 3.5 3.5 - 5.2 g/dL    Albumin/Globulin Ratio NOT REPORTED 1.0 - 2.5    GFR Non-African American >60 >60 mL/min    GFR African American >60 >60 mL/min    GFR Comment          GFR Staging NOT REPORTED    Magnesium   Result Value Ref Range    Magnesium 2.1 1.6 - 2.6 mg/dL   Lipid panel - fasting   Result Value Ref Range    Cholesterol 119 <200 mg/dL    HDL 44 >40 mg/dL    LDL Cholesterol 60 0 - 130 mg/dL    Chol/HDL Ratio 2.7 <5    Triglycerides 73 <150 mg/dL    VLDL NOT REPORTED 1 - 30 mg/dL   CBC   Result Value Ref Range    WBC 7.3 3.5 - 11.3 k/uL    RBC 4.34 4.21 - 5.77 m/uL    Hemoglobin 11.6 (L) 13.0 - 17.0 g/dL    Hematocrit 37.6 (L) 40.7 - 50.3 %    MCV 86.6 82.6 - 102.9 fL    MCH 26.7 25.2 - 33.5 pg    MCHC 30.9 28.4 - 34.8 g/dL    RDW 16.0 (H) 11.8 - 14.4 %    Platelets 095 341 - 316 k/uL    MPV 9.7 8.1 - 13.5 fL    NRBC Automated 0.0 0.0 per 100 WBC   Anti-Xa Unfract Heparin   Result Value Ref Range    Anti-XA Unfrac Heparin 0.92 (HH) 0.30 - 0.70 IU/L   Anti-Xa Unfract Heparin   Result Value Ref Range    Anti-XA Unfrac Heparin 0.38 0.30 - 0.70 IU/L   Body Fluid Cell Count with Differential   Result Value Ref Range    Color, Fluid NOT REPORTED     Appearance, Fluid NOT REPORTED     WBC, Fluid 2,024 /mm3    RBC, Fluid 163,000 /mm3    Specimen Type . THORACENTESIS FLUID     Neutrophil Count, Fluid 11 %    Lymphocytes, Body Fluid 22 %    Monocyte Count, Fluid NOT REPORTED %    Eos, Fluid NOT REPORTED 0 %    Basos, Fluid NOT REPORTED 0 %    Other Cells, Fluid MALIGNANT CELLS (SEE CYTOLOGY REPORT) %    Fluid Diff Comment NOT REPORTED    Cytology, Non-Gyn   Result Value Ref Range    Specimen Description . THORACENTESIS FLUID     Case Number: [id number]    Protein, Body Fluid   Result Value Ref Range    Specimen Type . THORACENTESIS FLUID     Total Protein, Body Fluid 4.0 g/dL   Lactate Dehydrogenase, Body Fluid   Result Value Ref Range    Specimen Type . THORACENTESIS FLUID     LD, Fluid 421 U/L   Glucose, Body Fluid   Result Value Ref Range    Specimen Type . THORACENTESIS FLUID     Glucose, Fluid 113 mg/dL   Anti-Xa Unfract Heparin   Result Value Ref Range    Anti-XA Unfrac Heparin 0.43 0.30 - 0.70 IU/L   Anti-Xa Unfract Heparin   Result Value Ref Range    Anti-XA Unfrac Heparin 0.34 0.30 - 0.70 IU/L   CBC   Result Value Ref Range    WBC 14.9 (H) 3.5 - 11.3 k/uL    RBC 4.06 (L) 4.21 - 5.77 m/uL    Hemoglobin 11.0 (L) 13.0 - 17.0 g/dL    Hematocrit 35.2 (L) 40.7 - 50.3 %    MCV 86.7 82.6 - 102.9 fL    MCH 27.1 25.2 - 33.5 pg    MCHC 31.3 28.4 - 34.8 g/dL    RDW 16.4 (H) 11.8 - 14.4 %    Platelets 347 868 - 090 k/uL    MPV 9.2 8.1 - 13.5 fL    NRBC Automated 0.0 0.0 per 100 WBC   Surgical Pathology   Result Value Ref Range    Surgical Pathology Report       -- Diagnosis --  PLEURAL FLUID, LEFT SIDE:          POSITIVE FOR MALIGNANCY;  METASTATIC LUNG ADENOCARCINOMA. Kim Uribe M.D  **Electronically Signed Out**         lenin/7/10/2020       Clinical Information  Pleural effusion    Source of Specimen  1: PLEURAL FLUID, LEFT SIDE    Gross Description  \"LEFT SIDE\"  1100.0 mL turbid, dark red fluid. MICROSCOPIC DESCRIPTION     Microscopic examination performed. The cell block slide shows  numerous malignant single cells with irregular pleomorphic nuclei with  prominent nucleoli and minimal to moderate amount of cytoplasm. Immunostains with adequate controls were performed with the following  results in the malignant cells:  Cytokeratin 7: Positive. TTF-1: Positive. Cytokeratin 20: Negative. P40: Negative. The combined immuno-morphologic features are consistent with  metastatic lung adenocarcinoma. Diagnosis of malignancy confirmed by Dr. Minal Rasmussen. [address] CONSULTATION        Patient Name: Pacheco Du: [id number]  Path Number: [id number]    [address] [place] [address], [place] [address].   [place], [address]  [phone]  Fax: [phone]     Basic Metabolic Panel w/ Reflex to MG   Result Value Ref Range    Glucose 178 (H) 70 - 99 mg/dL    BUN 32 (H) 8 - 23 mg/dL    CREATININE 1.33 (H) 0.70 - 1.20 mg/dL    Bun/Cre Ratio 24 (H) 9 - 20    Calcium 8.8 8.6 - 10.4 mg/dL    Sodium 135 135 - 144 mmol/L    Potassium 4.3 3.7 - 5.3 mmol/L    Chloride 94 (L) 98 - 107 mmol/L    CO2 27 20 - 31 mmol/L    Anion Gap 14 9 - 17 mmol/L    GFR Non-African American 53 (L) >60 mL/min    GFR African American >60 >60 mL/min    GFR Comment          GFR Staging NOT REPORTED    Troponin   Result Value Ref Range    Troponin, High Sensitivity 443 (HH) 0 - 22 ng/L    Troponin T NOT REPORTED <0.03 ng/mL    Troponin Interp NOT REPORTED    Brain Natriuretic Peptide   Result Value Ref Range    Pro-BNP 3,991 (H) <300 pg/mL    BNP Interpretation Pro-BNP Reference Range:    Anti-Xa Unfract Heparin   Result Value Ref Range    Anti-XA Unfrac Heparin 0.93 (HH) 0.30 - 0.70 IU/L   Basic Metabolic Panel   Result Value Ref Range    Glucose 147 (H) 70 - 99 mg/dL    BUN 44 (H) 8 - 23 mg/dL    CREATININE 1.32 (H) 0.70 - 1.20 mg/dL    Bun/Cre Ratio 33 (H) 9 - 20    Calcium 8.4 (L) 8.6 - 10.4 mg/dL    Sodium 138 135 - 144 mmol/L    Potassium 4.5 3.7 - 5.3 mmol/L    Chloride 100 98 - 107 mmol/L    CO2 26 20 - 31 mmol/L    Anion Gap 12 9 - 17 mmol/L    GFR Non-African American 53 (L) >60 mL/min    GFR African American >60 >60 mL/min    GFR Comment          GFR Staging NOT REPORTED    CBC Auto Differential   Result Value Ref Range    WBC 16.0 (H) 3.5 - 11.3 k/uL    RBC 4.13 (L) 4.21 - 5.77 m/uL    Hemoglobin 11.0 (L) 13.0 - 17.0 g/dL    Hematocrit 36.5 (L) 40.7 - 50.3 %    MCV 88.4 82.6 - 102.9 fL    MCH 26.6 25.2 - 33.5 pg    MCHC 30.1 28.4 - 34.8 g/dL    RDW 16.7 (H) 11.8 - 14.4 %    Platelets 867 470 - 792 k/uL    MPV 8.8 8.1 - 13.5 fL    NRBC Automated 0.0 0.0 per 100 WBC    Differential Type NOT REPORTED     WBC Morphology NOT REPORTED     RBC Morphology NOT REPORTED Platelet Estimate NOT REPORTED     Seg Neutrophils 92 (H) 36 - 65 %    Lymphocytes 2 (L) 24 - 43 %    Monocytes 5 3 - 12 %    Eosinophils % 0 (L) 1 - 4 %    Basophils 0 0 - 2 %    Immature Granulocytes 1 (H) 0 %    Segs Absolute 14.72 (H) 1.50 - 8.10 k/uL    Absolute Lymph # 0.32 (L) 1.10 - 3.70 k/uL    Absolute Mono # 0.80 0.10 - 1.20 k/uL    Absolute Eos # 0.00 0.00 - 0.44 k/uL    Basophils Absolute 0.00 0.00 - 0.20 k/uL    Absolute Immature Granulocyte 0.16 0.00 - 0.30 k/uL   Anti-Xa Unfract Heparin   Result Value Ref Range    Anti-XA Unfrac Heparin 0.58 0.30 - 0.70 IU/L   Anti-Xa Unfract Heparin   Result Value Ref Range    Anti-XA Unfrac Heparin 0.80 (HH) 0.30 - 0.70 IU/L   CBC   Result Value Ref Range    WBC 13.5 (H) 3.5 - 11.3 k/uL    RBC 4.37 4.21 - 5.77 m/uL    Hemoglobin 11.5 (L) 13.0 - 17.0 g/dL    Hematocrit 38.6 (L) 40.7 - 50.3 %    MCV 88.3 82.6 - 102.9 fL    MCH 26.3 25.2 - 33.5 pg    MCHC 29.8 28.4 - 34.8 g/dL    RDW 16.8 (H) 11.8 - 14.4 %    Platelets 698 258 - 247 k/uL    MPV 9.0 8.1 - 13.5 fL    NRBC Automated 0.0 0.0 per 100 WBC   Basic Metabolic Panel   Result Value Ref Range    Glucose 119 (H) 70 - 99 mg/dL    BUN 38 (H) 8 - 23 mg/dL    CREATININE 1.04 0.70 - 1.20 mg/dL    Bun/Cre Ratio 37 (H) 9 - 20    Calcium 8.4 (L) 8.6 - 10.4 mg/dL    Sodium 141 135 - 144 mmol/L    Potassium 3.8 3.7 - 5.3 mmol/L    Chloride 101 98 - 107 mmol/L    CO2 28 20 - 31 mmol/L    Anion Gap 12 9 - 17 mmol/L    GFR Non-African American >60 >60 mL/min    GFR African American >60 >60 mL/min    GFR Comment          GFR Staging NOT REPORTED    EKG 12 Lead   Result Value Ref Range    Ventricular Rate 123 BPM    Atrial Rate 90 BPM    QRS Duration 142 ms    Q-T Interval 374 ms    QTc Calculation (Bazett) 535 ms    R Axis 5 degrees    T Axis 21 degrees   EKG 12 lead   Result Value Ref Range    Ventricular Rate 116 BPM    Atrial Rate 116 BPM    P-R Interval 190 ms    QRS Duration 150 ms    Q-T Interval 436 ms    QTc Calculation (Bazett) 606 ms    P Axis 55 degrees    R Axis -33 degrees    T Axis 80 degrees         IMAGING DATA:    Xr Chest Portable    Result Date: 7/11/2020  EXAMINATION: ONE XRAY VIEW OF THE CHEST 7/11/2020 9:10 am COMPARISON: 07/10/2020 HISTORY: ORDERING SYSTEM PROVIDED HISTORY: pleural effusion/ hypoxia TECHNOLOGIST PROVIDED HISTORY: pleural effusion/ hypoxia Acuity: Acute Type of Exam: Unknown FINDINGS: Cardiomegaly. Left-sided pleural effusion and adjacent airspace disease. Right hemithorax is clear. Trachea is midline. Osseous structures and soft tissues are grossly intact. Osseous structures and soft tissues are grossly intact. Left basilar airspace opacity, likely combination of pleural effusion and atelectasis. Findings are essentially unchanged as compared to previous study. Xr Chest Portable    Result Date: 7/10/2020  EXAMINATION: ONE XRAY VIEW OF THE CHEST 7/10/2020 4:42 am COMPARISON: July 9, 2020 HISTORY: ORDERING SYSTEM PROVIDED HISTORY: infiltrate TECHNOLOGIST PROVIDED HISTORY: infiltrate Reason for Exam: infiltrate f/u FINDINGS: Small left pleural effusion and left lung base opacity have decreased. Right lung is clear. Cardiomegaly. No pulmonary edema. Decreased left pleural effusion and left lung base opacity. Xr Chest Portable    Result Date: 7/9/2020  EXAMINATION: ONE XRAY VIEW OF THE CHEST 7/9/2020 10:24 am COMPARISON: AP chest from 07/08/2020 HISTORY: ORDERING SYSTEM PROVIDED HISTORY: SOB with CP TECHNOLOGIST PROVIDED HISTORY: SOB with CP Reason for Exam: Pt c/o Chest pain, SOB. AP UPRIGHT PORTABLE Acuity: Acute Type of Exam: Initial History of COPD and influenza a FINDINGS: April lordotic. Overlying ECG monitor leads and gown snaps. Cardiac silhouette unchanged; known right paratracheal mass with mild impingement on the trachea; mediastinal structures otherwise midline. Opacity left base with blunting of the costophrenic angle, unchanged.   Vague opacity right mid lung, similar by comparison. Minimal bibasilar atelectasis. Bones unchanged. Similar findings, as above. Xr Chest Portable    Result Date: 7/7/2020  EXAMINATION: ONE XRAY VIEW OF THE CHEST 7/6/2020 11:01 pm COMPARISON: 03/24/2019 HISTORY: ORDERING SYSTEM PROVIDED HISTORY: Cough, SOB TECHNOLOGIST PROVIDED HISTORY: Cough, SOB Reason for Exam: cough Acuity: Acute Type of Exam: Initial Additional signs and symptoms: SOB Relevant Medical/Surgical History: COPD FINDINGS: Cardiac silhouette is enlarged and there is mild central vasculature. Large left pleural effusion with adjacent airspace disease/atelectasis. Right hemithorax is clear. Trachea is midline. Osseous structures and soft tissues are grossly intact. Large left pleural effusion with adjacent airspace disease/atelectasis. Xr Chest 1 Vw    Result Date: 7/8/2020  EXAMINATION: ONE XRAY VIEW OF THE CHEST 7/8/2020 3:13 pm COMPARISON: 07/08/2020 at 06:7 HISTORY: ORDERING SYSTEM PROVIDED HISTORY: post left side thoracentesis TECHNOLOGIST PROVIDED HISTORY: post left side thoracentesis Reason for Exam: Post Lt side thoracentesis Acuity: Acute Type of Exam: Initial FINDINGS: Cardiac size normal.  Superior mediastinal widening with bulging of the right paratracheal region consistent with known lymphadenopathy. Status post left thoracentesis with previous like pleural effusion now small to moderate. No left pneumothorax. Right lung clear. Interval left thoracentesis with decrease in large left pleural effusion now small to moderate. Redemonstration of bulging right aspect superior mediastinum consistent with known lymphadenopathy. Xr Chest 1 Vw    Result Date: 7/8/2020  EXAMINATION: ONE XRAY VIEW OF THE CHEST 7/8/2020 6:42 am COMPARISON: Radiographs 07/06/2020, 03/24/2019. Chest CT 07/07/2020.  HISTORY: ORDERING SYSTEM PROVIDED HISTORY: effusion TECHNOLOGIST PROVIDED HISTORY: effusion Reason for Exam: effusion Acuity: Unknown Type of Exam: None. HISTORY: ORDERING SYSTEM PROVIDED HISTORY: PE, elevated d-dimer TECHNOLOGIST PROVIDED HISTORY: PE, elevated d-dimer Reason for Exam: hx of PE, elevated d dimer Acuity: Acute Type of Exam: Initial FINDINGS: The visualized veins of the right lower extremity are patent and free of echogenic thrombus. The veins are normally compressible and have normal phasic flow. Overall somewhat limited due to patient body habitus; in particular, the calf veins are not optimally visualized. Mild soft tissue edema in the calf     No convincing evidence of DVT in the right lower extremity. Ir Guided Thoracentesis Pleural    Result Date: 7/8/2020  PROCEDURE: Farhan Presley LEFT THORACENTESIS 7/8/2020 HISTORY: ORDERING SYSTEM PROVIDED HISTORY: effusion TECHNOLOGIST PROVIDED HISTORY: effusion TECHNIQUE: Informed consent was obtained after a detailed explanation of the procedure including risks, benefits, and alternatives. Universal protocol was performed. A time-out was performed prior to commencing the procedure. The left chest was prepped and draped in sterile fashion and local anesthesia was achieved with lidocaine. A 5 Tajik one-step device was advanced under ultrasound guidance into pleural effusion and thoracentesis was performed. 1.5 L of reddish brown fluid was removed. 1 L was sent to the lab for a aliza. The needle was removed. Hemostasis obtained with direct pressure. A sterile dressing was applied. The patient tolerated the procedure well. FINDINGS: A total of 1.5 L reddish brown fluid was removed. Successful ultrasound guided left thoracentesis.          IMPRESSION:   Primary Problem  PE (pulmonary thromboembolism) [facility])    Active Hospital Problems    Diagnosis Date Noted    At risk for fluid volume overload [Z91.89] 07/10/2020    Nonischemic cardiomyopathy ([facility] [address] [I42.8] 07/10/2020    Pulmonary embolism during current hospitalization ([address] [I26.99] 07/07/2020    Chronic obstructive pulmonary disease ([facility] [J44.9] 07/07/2020    Hypertriglyceridemia [E78.1] 07/07/2020    NSTEMI (non-ST elevated myocardial infarction) ([facility] [I21.4] 07/07/2020    Paroxysmal atrial fibrillation ([facility] [I48.0] 07/07/2020    PE (pulmonary thromboembolism) ([place] [address] [I26.99] 07/07/2020    Acute respiratory failure with hypoxia (HCC) [J96.01] 07/07/2020    COPD (chronic obstructive pulmonary disease) (HCC) [J44.9]      Adenocarcinoma of lung  Malignant pleural effusion  Acute pulmonary embolism  Atrial flutter  COPD with acute exacerbation  Nonischemic cardiomyopathy ejection fraction 35-40%. Acute kidney injury  Hypoxic respiratory failure      RECOMMENDATIONS:  1. I personally reviewed results of lab work-up imaging studies and other relevant clinical data. 2. Reviewed results of pathology report with the patient which confirms adenocarcinoma. Immunohistochemistry is consistent with lung primary. 3. Discussed natural history of lung cancer. Reviewed NCCN guidelines for staging. Patient will need MRI brain and CT abdomen pelvis. 4. Reviewed NCCN guidelines for treatment planning. Patient will need dialysis for PDL 1 status and tumor mutation burden as well as assessment for actionable mutations. 5. Reviewed treatment options including targeted therapy, immunotherapy, chemotherapy as well as a combination. 6. Continue anticoagulation. Patient will likely need long-term anticoagulation given lung cancer  7. Continue to monitor for recurrent effusion we will re-order x-ray as an outpatient. 8. Once medically stable okay to discharge from medical oncology standpoint. We will set up outpatient follow-up office appointment. 9. Patient has a follow-up scheduled at [facility] nadine Eric office on 7/15/Wednesday at 12:15 PM.  10. I will also reach out to internal medicine attending to discuss the plan. Discussed with patient and Nurse. Thank you for asking us to see this patient.           Antonio Andrews MD          This note is created with the assistance of a speech recognition program.  While intending to generate a document that actually reflects the content of the visit, the document can still have some errors including those of syntax and sound a like substitutions which may escape proof reading. It such instances, actual meaning can be extrapolated by contextual diversion.

## 2020-07-11 NOTE — FLOWSHEET NOTE
Patient states well. States he was waiting for his spouse. Kindly declines visit at this time. Follow up as needed. 07/11/20 1125   Encounter Summary   Services provided to: Patient   Referral/Consult From: [facility]   (7-11-20)   Complexity of Encounter Low   Length of Encounter 15 minutes   Spiritual Assessment Completed Yes   Routine   Type Follow up   Assessment Calm; Approachable   Intervention Explored feelings, thoughts, concerns   Outcome Expressed gratitude; Refused/declined

## 2020-07-11 NOTE — DISCHARGE SUMMARY
-admitted from outside hospital for non-STEMI with known PE and large pleural effusion.  Patient is a former smoker and quit 1 year ago. Lavelle Mackenzie was also found to have a likely mass identified on his chest CT.  Patient is in respiratory distress and near failure on his arrival.     7/8 -patient condition is gradually improved overnight.  Patient reports significantly less shortness of breath today.  Troponins have plateaued and are trending down.  Thoracentesis scheduled for today.  We await cardiology input.     7/9 -deterioration in condition overnight. Short of breath and chest pain today. Rate controlled with CCB and heart cath scheduled this afternoon.     7/10 - 7/7 -admitted from outside hospital for non-STEMI with known PE and large pleural effusion.  Patient is a former smoker and quit 1 year ago. Lavelle Mackenzie was also found to have a likely mass identified on his chest CT.  Patient is in respiratory distress and near failure on his arrival.     7/8 -patient condition is gradually improved overnight.  Patient reports significantly less shortness of breath today.  Troponins have plateaued and are trending down.  Thoracentesis scheduled for today.  We await cardiology input.     7/9 -deterioration in condition overnight. Short of breath and chest pain today. Rate controlled with CCB and heart cath scheduled this afternoon.     7/10 -  Transition to oral anticoagulation and anticipate discharge later today. - patient condition has improved. Heart cath clean yesterday. Continues to require supplemental oxygen. Fluid studies on thoracentesis are still pending. Transition to oral anticoagulation and anticipate discharge later today. 7/11 : Had a detailed discussion and shared the results of the thoracentesis with the patient that is showing metastatic adenocarcinoma of the lung patient is very overwhelmed. Seen by oncologist and arrangement for outpatient follow-up was done.     Cardiologist this morning adjusted calcium channel blocker dose    Arrangement for home O2 done, DME face-to-face added. Med rec done  Scripts added   BRIDGER signed  30+ minutes spent       Significant therapeutic interventions: as above     Significant Diagnostic Studies:     Radiology:  Xr Chest Portable    Result Date: 7/11/2020  Left basilar airspace opacity, likely combination of pleural effusion and atelectasis. Findings are essentially unchanged as compared to previous study. Xr Chest Portable    Result Date: 7/10/2020  Decreased left pleural effusion and left lung base opacity. Xr Chest Portable    Result Date: 7/9/2020  Similar findings, as above. Xr Chest Portable    Result Date: 7/7/2020  Large left pleural effusion with adjacent airspace disease/atelectasis. Xr Chest 1 Vw    Result Date: 7/8/2020  Interval left thoracentesis with decrease in large left pleural effusion now small to moderate. Redemonstration of bulging right aspect superior mediastinum consistent with known lymphadenopathy. Xr Chest 1 Vw    Result Date: 7/8/2020  No significant change in moderate size left pleural effusion and probable right basilar atelectasis. Unchanged appearance of fullness in the superior mediastinum, corresponding to lymphadenopathy. Ct Chest Pulmonary Embolism W Contrast    Result Date: 7/7/2020  Right distal lobar and right lower lobe segmental emboli. Bowing of the interventricular septum worrisome for heart strain unclear if this is due to the right-sided emboli versus the other additional findings within the lungs. Moderate to large left-sided effusion with left basilar opacities suggestive of areas of compressive atelectasis. Right paratracheal mass and right hilar adenopathy. Please correlate with any potential biopsy results and history (previous exam recommended biopsy).  This is otherwise unchanged in size from prior exam.     Us Dup Lower Extremity Left Bryan    Result Date: 7/7/2020  No convincing evidence of DVT in the left lower extremity. Us Dup Lower Extremity Right Bryan    Result Date: 7/7/2020  No convincing evidence of DVT in the right lower extremity. Ir Guided Thoracentesis Pleural    Result Date: 7/8/2020  Successful ultrasound guided left thoracentesis. Consultations:    Consults:     Final Specialist Recommendations/Findings:   IP CONSULT TO CARDIOLOGY  IP CONSULT TO PULMONOLOGY  IP CONSULT TO PULMONOLOGY  IP CONSULT TO ONCOLOGY      The patient was seen and examined on day of discharge and this discharge summary is in conjunction with any daily progress note from day of discharge. Discharge plan:     Disposition: Home    Physician Follow Up:     Rosario Noe [facility].  [phone]    Go on 7/15/2020  for follow up appointment at 12:15pm    Maico Fernando.  [address]    Schedule an appointment as soon as possible for a visit in 2 weeks  For follow up appointment    Prince Valverde MD  [facility] [address]  [address] [facility]  [phone]    In 1 week      Tami Bourgeois MD  [address] [facility]  [phone]    In 1 week         Requiring Further Evaluation/Follow Up POST HOSPITALIZATION/Incidental Findings:     Diet: cardiac diet    Activity: As tolerated    Instructions to Patient:     Discharge Medications:      Medication List      START taking these medications    * apixaban 5 MG Tabs tablet  Commonly known as:  ELIQUIS  Take 2 tablets by mouth 2 times daily for 6 days     * apixaban 5 MG Tabs tablet  Commonly known as:  ELIQUIS  Take 1 tablet by mouth 2 times daily  Start taking on:  July 17, 2020     dilTIAZem 360 MG extended release capsule  Commonly known as:  CARDIZEM CD  Take 1 capsule by mouth daily     pantoprazole 40 MG tablet  Commonly known as:  PROTONIX  Take 1 tablet by mouth every morning (before breakfast)  Start taking on:  July 12, 2020         * This list has 2 medication(s) that are the same as other medications prescribed for you. Read the directions carefully, and ask your doctor or other care provider to review them with you. CHANGE how you take these medications    aspirin 81 MG EC tablet  What changed:  Another medication with the same name was removed. Continue taking this medication, and follow the directions you see here. CONTINUE taking these medications    metoprolol tartrate 25 MG tablet  Commonly known as:  LOPRESSOR  Take 1 tablet by mouth 2 times daily     MULTI VITAMIN DAILY PO     Trelegy Ellipta 100-62.5-25 MCG/INH Aepb  Generic drug:  fluticasone-umeclidin-vilant        STOP taking these medications    atorvastatin 80 MG tablet  Commonly known as:  LIPITOR     famotidine 20 MG tablet  Commonly known as:  PEPCID     heparin (porcine) 100 UNIT/ML Soln infusion     nitroGLYCERIN 0.4 MG SL tablet  Commonly known as:  NITROSTAT     ondansetron 4 MG/2ML injection  Commonly known as:  ZOFRAN     promethazine 12.5 MG tablet  Commonly known as:  PHENERGAN           Where to Get Your Medications      These medications were sent to [address] - F [phone] [address] [address]    Phone:  [phone]   · apixaban 5 MG Tabs tablet  · apixaban 5 MG Tabs tablet  · dilTIAZem 360 MG extended release capsule  · metoprolol tartrate 25 MG tablet  · pantoprazole 40 MG tablet         No discharge procedures on file. Time Spent on discharge is  35 mins in patient examination, evaluation, counseling as well as medication reconciliation, prescriptions for required medications, discharge plan and follow up. Electronically signed by   Mike Olivarez MD  7/11/2020  1:45 PM      Thank you Dr. Lydia Gottron, MD for the opportunity to be involved in this patient's care.

## 2020-07-11 NOTE — PROGRESS NOTES
Patient and wife given discharge instructions. Notified of follow-up appt with Dr Jero Nelson and to call offfices for follow-ups with PCP,Dr Ximena Abarca ,and Dr Britt Mckeon. Patient received eliquis starter kit and home oxygen. Discharge with wife to home by private car with belongings.

## 2020-07-11 NOTE — PLAN OF CARE
Problem: Falls - Risk of:  Goal: Will remain free from falls  Description: Will remain free from falls  7/11/2020 0928 by Boone Brunner, RN  Note: Pt fall risk, fall band present, falling star, safety alarm activated and in use as needed. Hourly rounding performed. Pt encouraged to use call light. See Diamond Zepeda fall risk assessment. Will continue to monitor patient closely. 7/11/2020 0927 by Boone Brunner, RN  Outcome: Ongoing  Note: Pt fall risk, fall band present, falling star, safety alarm activated and in use as needed. Hourly rounding performed. Pt encouraged to use call light. See Diamond Zepeda fall risk assessment. Will continue to monitor patient closely. 7/10/2020 2137 by Vanessa Griffith RN  Outcome: Ongoing  Goal: Absence of physical injury  Description: Absence of physical injury  7/11/2020 0927 by Boone Brunner, RN  Outcome: Ongoing  7/10/2020 2137 by Vanessa Griffith RN  Outcome: Ongoing     Problem: Skin Integrity:  Goal: Will show no infection signs and symptoms  Description: Will show no infection signs and symptoms  7/11/2020 0927 by Boone Brunner, RN  Outcome: Ongoing  7/10/2020 2137 by Vanessa Griffith RN  Outcome: Ongoing  Goal: Absence of new skin breakdown  Description: Absence of new skin breakdown  7/11/2020 0927 by Boone Brunner, RN  Outcome: Ongoing  7/10/2020 2137 by Vanessa Griffith RN  Outcome: Ongoing     Problem: Gas Exchange - Impaired:  Goal: Levels of oxygenation will improve  Description: Levels of oxygenation will improve  7/11/2020 0928 by Boone Brunner, RN  Note: Patient requiring 3.5L NC 24/7, patient tolerating well. Will continue to monitor patient closely. 7/11/2020 0927 by Boone Brunner, RN  Outcome: Ongoing  Note: Patient requiring 3.5L NC 24/7, patient tolerating well.   7/10/2020 2137 by Vanessa Griffith RN  Outcome: Ongoing

## 2020-07-11 NOTE — CARE COORDINATION
Discharge planning    Patient admitted with PE and will need anticoagulation. Dr Emely Cope gave verbal order to run [employer] for cost analysis and to see if PA needed. Call to [facility] at [phone] and will call in eliquis 5 mg bid for his mainentance dose and will obtain starter pack from our pharmacy prior to discharge. Co pay will be 42.00 placed on profile and will need to have pharmacy fill starter pack    Patient qualified for home 02 per RT notes. Will need documentation and order in order to obtain. Will discuss with patient preference of DME company     Patient has no preference to DME company . Faxed over face sheet, med sheets, RX , documentation and RT testing to [facility]> . .called one call  and they will page him. Call to [facility] and 02 will be here within the hour  Pharmacy has rx and will fill     Notified sue of the above. Did sit with patient and explain all the above. Offered home care and or visit to anticoagulation clinic but he is declining that.

## 2020-07-13 ENCOUNTER — TELEPHONE (OUTPATIENT)
Dept: CASE MANAGEMENT | Age: 70
End: 2020-07-13

## 2020-07-13 ENCOUNTER — TELEPHONE (OUTPATIENT)
Dept: ONCOLOGY | Age: 70
End: 2020-07-13

## 2020-07-13 NOTE — TELEPHONE ENCOUNTER
Name: Elma Kerns  : 1950  MRN: [id number]    Oncology Navigation Follow-Up Note  Navigator received call from pts. Wife and spouse returning home from work and pt. Disoriented and not wearing oxygen, spoused called 911 and placed oxygen back on pt. . Wife informing writer EMt report vitals good and pt. Oriented at this time. Pt. Not transported, but not sure if due to pt. Refusing or pt. Assessment within normal limits. Wife tearful and difficult to understand. Dr. Bower Schooling informed of above and plan to monitor pt. This evening and if further issues in AM plan to have him go to ED . Pt. May need CXR to check on status of pleural effusion?    Electronically signed by Andreea Barnes RN on 2020 at 1:51 PM

## 2020-07-13 NOTE — TELEPHONE ENCOUNTER
Writer received Skype from [facility] stating Dr. Tracey Hickey wanted tempus ordered on pt's pleural fluid. Tempus order completed and placed in bin for Dr. Tracey Hickey to sign Wed.

## 2020-07-14 ENCOUNTER — TELEPHONE (OUTPATIENT)
Dept: CASE MANAGEMENT | Age: 70
End: 2020-07-14

## 2020-07-14 ENCOUNTER — TELEPHONE (OUTPATIENT)
Dept: ONCOLOGY | Age: 70
End: 2020-07-14

## 2020-07-14 LAB
CULTURE: NORMAL
DIRECT EXAM: NORMAL
DIRECT EXAM: NORMAL
Lab: NORMAL
SPECIMEN DESCRIPTION: NORMAL

## 2020-07-14 NOTE — TELEPHONE ENCOUNTER
Pt unable to come to [facility] due to insurance, per corsteven, registration. Spoke with dr Momo Phillips and he spoke to dr Patricia Lucero at [facility] and wants records faxed there.      Call and spoke with tci and sent records, confirmation rcv'd

## 2020-07-15 ENCOUNTER — TELEPHONE (OUTPATIENT)
Dept: ONCOLOGY | Age: 70
End: 2020-07-15

## 2020-07-15 LAB
EKG ATRIAL RATE: 104 BPM
EKG P AXIS: 54 DEGREES
EKG P-R INTERVAL: 216 MS
EKG Q-T INTERVAL: 378 MS
EKG QRS DURATION: 144 MS
EKG QTC CALCULATION (BAZETT): 492 MS
EKG R AXIS: -16 DEGREES
EKG T AXIS: 43 DEGREES
EKG VENTRICULAR RATE: 102 BPM

## 2020-07-15 NOTE — TELEPHONE ENCOUNTER
Dr Sherryle Maryland instructed writer to dispose [employer] order form stating patient's insurance is not accepted here and patient will be transferring to [facility] once d/c from hospital. Andre Mcgill RN

## 2020-08-24 LAB
CULTURE: NORMAL
DIRECT EXAM: NORMAL
Lab: NORMAL
SPECIMEN DESCRIPTION: NORMAL

## 2020-11-03 PROBLEM — J44.9 CHRONIC OBSTRUCTIVE PULMONARY DISEASE (HCC): Status: RESOLVED | Noted: 2020-07-07 | Resolved: 2020-11-03
